# Patient Record
Sex: FEMALE | Race: OTHER | HISPANIC OR LATINO
[De-identification: names, ages, dates, MRNs, and addresses within clinical notes are randomized per-mention and may not be internally consistent; named-entity substitution may affect disease eponyms.]

---

## 2018-12-11 ENCOUNTER — TRANSCRIPTION ENCOUNTER (OUTPATIENT)
Age: 23
End: 2018-12-11

## 2018-12-11 ENCOUNTER — INPATIENT (INPATIENT)
Facility: HOSPITAL | Age: 23
LOS: 0 days | Discharge: LEFT BEFORE DISCHARGE | End: 2018-12-11
Attending: SURGERY | Admitting: SURGERY
Payer: MEDICAID

## 2018-12-11 VITALS
RESPIRATION RATE: 16 BRPM | HEART RATE: 55 BPM | HEIGHT: 64 IN | SYSTOLIC BLOOD PRESSURE: 110 MMHG | DIASTOLIC BLOOD PRESSURE: 62 MMHG | WEIGHT: 125 LBS | OXYGEN SATURATION: 95 % | TEMPERATURE: 97 F

## 2018-12-11 VITALS
HEART RATE: 69 BPM | DIASTOLIC BLOOD PRESSURE: 71 MMHG | SYSTOLIC BLOOD PRESSURE: 120 MMHG | TEMPERATURE: 98 F | OXYGEN SATURATION: 100 % | RESPIRATION RATE: 17 BRPM

## 2018-12-11 DIAGNOSIS — Z98.890 OTHER SPECIFIED POSTPROCEDURAL STATES: Chronic | ICD-10-CM

## 2018-12-11 DIAGNOSIS — K80.00 CALCULUS OF GALLBLADDER WITH ACUTE CHOLECYSTITIS WITHOUT OBSTRUCTION: ICD-10-CM

## 2018-12-11 LAB
ALBUMIN SERPL ELPH-MCNC: 3.8 G/DL — SIGNIFICANT CHANGE UP (ref 3.3–5)
ALP SERPL-CCNC: 121 U/L — HIGH (ref 40–120)
ALT FLD-CCNC: 30 U/L — SIGNIFICANT CHANGE UP (ref 12–78)
AMYLASE P1 CFR SERPL: 55 U/L — SIGNIFICANT CHANGE UP (ref 25–115)
ANION GAP SERPL CALC-SCNC: 10 MMOL/L — SIGNIFICANT CHANGE UP (ref 5–17)
APPEARANCE UR: CLEAR — SIGNIFICANT CHANGE UP
AST SERPL-CCNC: 15 U/L — SIGNIFICANT CHANGE UP (ref 15–37)
BASOPHILS # BLD AUTO: 0.03 K/UL — SIGNIFICANT CHANGE UP (ref 0–0.2)
BASOPHILS NFR BLD AUTO: 0.2 % — SIGNIFICANT CHANGE UP (ref 0–2)
BILIRUB SERPL-MCNC: 0.2 MG/DL — SIGNIFICANT CHANGE UP (ref 0.2–1.2)
BILIRUB UR-MCNC: NEGATIVE — SIGNIFICANT CHANGE UP
BUN SERPL-MCNC: 11 MG/DL — SIGNIFICANT CHANGE UP (ref 7–23)
CALCIUM SERPL-MCNC: 8.5 MG/DL — SIGNIFICANT CHANGE UP (ref 8.5–10.1)
CHLORIDE SERPL-SCNC: 105 MMOL/L — SIGNIFICANT CHANGE UP (ref 96–108)
CO2 SERPL-SCNC: 24 MMOL/L — SIGNIFICANT CHANGE UP (ref 22–31)
COLOR SPEC: YELLOW — SIGNIFICANT CHANGE UP
COMMENT - URINE: SIGNIFICANT CHANGE UP
CREAT SERPL-MCNC: 0.83 MG/DL — SIGNIFICANT CHANGE UP (ref 0.5–1.3)
DIFF PNL FLD: NEGATIVE — SIGNIFICANT CHANGE UP
EOSINOPHIL # BLD AUTO: 0.05 K/UL — SIGNIFICANT CHANGE UP (ref 0–0.5)
EOSINOPHIL NFR BLD AUTO: 0.4 % — SIGNIFICANT CHANGE UP (ref 0–6)
EPI CELLS # UR: SIGNIFICANT CHANGE UP
GLUCOSE SERPL-MCNC: 127 MG/DL — HIGH (ref 70–99)
GLUCOSE UR QL: NEGATIVE MG/DL — SIGNIFICANT CHANGE UP
HCG SERPL-ACNC: <1 MIU/ML — SIGNIFICANT CHANGE UP
HCT VFR BLD CALC: 40.1 % — SIGNIFICANT CHANGE UP (ref 34.5–45)
HGB BLD-MCNC: 13.6 G/DL — SIGNIFICANT CHANGE UP (ref 11.5–15.5)
IMM GRANULOCYTES NFR BLD AUTO: 0.2 % — SIGNIFICANT CHANGE UP (ref 0–1.5)
KETONES UR-MCNC: NEGATIVE — SIGNIFICANT CHANGE UP
LEUKOCYTE ESTERASE UR-ACNC: ABNORMAL
LIDOCAIN IGE QN: 153 U/L — SIGNIFICANT CHANGE UP (ref 73–393)
LYMPHOCYTES # BLD AUTO: 1.36 K/UL — SIGNIFICANT CHANGE UP (ref 1–3.3)
LYMPHOCYTES # BLD AUTO: 10.5 % — LOW (ref 13–44)
MCHC RBC-ENTMCNC: 29.8 PG — SIGNIFICANT CHANGE UP (ref 27–34)
MCHC RBC-ENTMCNC: 33.9 GM/DL — SIGNIFICANT CHANGE UP (ref 32–36)
MCV RBC AUTO: 87.7 FL — SIGNIFICANT CHANGE UP (ref 80–100)
MONOCYTES # BLD AUTO: 0.41 K/UL — SIGNIFICANT CHANGE UP (ref 0–0.9)
MONOCYTES NFR BLD AUTO: 3.2 % — SIGNIFICANT CHANGE UP (ref 2–14)
NEUTROPHILS # BLD AUTO: 11.02 K/UL — HIGH (ref 1.8–7.4)
NEUTROPHILS NFR BLD AUTO: 85.5 % — HIGH (ref 43–77)
NITRITE UR-MCNC: NEGATIVE — SIGNIFICANT CHANGE UP
NRBC # BLD: 0 /100 WBCS — SIGNIFICANT CHANGE UP (ref 0–0)
PH UR: 8 — SIGNIFICANT CHANGE UP (ref 5–8)
PLATELET # BLD AUTO: 239 K/UL — SIGNIFICANT CHANGE UP (ref 150–400)
POTASSIUM SERPL-MCNC: 3.7 MMOL/L — SIGNIFICANT CHANGE UP (ref 3.5–5.3)
POTASSIUM SERPL-SCNC: 3.7 MMOL/L — SIGNIFICANT CHANGE UP (ref 3.5–5.3)
PROT SERPL-MCNC: 7.2 GM/DL — SIGNIFICANT CHANGE UP (ref 6–8.3)
PROT UR-MCNC: NEGATIVE MG/DL — SIGNIFICANT CHANGE UP
RBC # BLD: 4.57 M/UL — SIGNIFICANT CHANGE UP (ref 3.8–5.2)
RBC # FLD: 12 % — SIGNIFICANT CHANGE UP (ref 10.3–14.5)
SODIUM SERPL-SCNC: 139 MMOL/L — SIGNIFICANT CHANGE UP (ref 135–145)
SP GR SPEC: 1.01 — SIGNIFICANT CHANGE UP (ref 1.01–1.02)
UROBILINOGEN FLD QL: NEGATIVE MG/DL — SIGNIFICANT CHANGE UP
WBC # BLD: 12.9 K/UL — HIGH (ref 3.8–10.5)
WBC # FLD AUTO: 12.9 K/UL — HIGH (ref 3.8–10.5)
WBC UR QL: SIGNIFICANT CHANGE UP

## 2018-12-11 PROCEDURE — 99285 EMERGENCY DEPT VISIT HI MDM: CPT | Mod: 25

## 2018-12-11 PROCEDURE — 74177 CT ABD & PELVIS W/CONTRAST: CPT | Mod: 26

## 2018-12-11 PROCEDURE — 76700 US EXAM ABDOM COMPLETE: CPT | Mod: 26

## 2018-12-11 PROCEDURE — 71045 X-RAY EXAM CHEST 1 VIEW: CPT | Mod: 26

## 2018-12-11 PROCEDURE — 99221 1ST HOSP IP/OBS SF/LOW 40: CPT

## 2018-12-11 RX ORDER — ACETAMINOPHEN 500 MG
650 TABLET ORAL EVERY 6 HOURS
Qty: 0 | Refills: 0 | Status: DISCONTINUED | OUTPATIENT
Start: 2018-12-11 | End: 2018-12-11

## 2018-12-11 RX ORDER — MOXIFLOXACIN HYDROCHLORIDE TABLETS, 400 MG 400 MG/1
1 TABLET, FILM COATED ORAL
Qty: 20 | Refills: 0
Start: 2018-12-11 | End: 2018-12-20

## 2018-12-11 RX ORDER — KETOROLAC TROMETHAMINE 30 MG/ML
30 SYRINGE (ML) INJECTION ONCE
Qty: 0 | Refills: 0 | Status: DISCONTINUED | OUTPATIENT
Start: 2018-12-11 | End: 2018-12-11

## 2018-12-11 RX ORDER — PIPERACILLIN AND TAZOBACTAM 4; .5 G/20ML; G/20ML
3.38 INJECTION, POWDER, LYOPHILIZED, FOR SOLUTION INTRAVENOUS EVERY 8 HOURS
Qty: 0 | Refills: 0 | Status: DISCONTINUED | OUTPATIENT
Start: 2018-12-11 | End: 2018-12-11

## 2018-12-11 RX ORDER — SODIUM CHLORIDE 9 MG/ML
1000 INJECTION INTRAMUSCULAR; INTRAVENOUS; SUBCUTANEOUS ONCE
Qty: 0 | Refills: 0 | Status: COMPLETED | OUTPATIENT
Start: 2018-12-11 | End: 2018-12-11

## 2018-12-11 RX ORDER — MORPHINE SULFATE 50 MG/1
2 CAPSULE, EXTENDED RELEASE ORAL EVERY 4 HOURS
Qty: 0 | Refills: 0 | Status: DISCONTINUED | OUTPATIENT
Start: 2018-12-11 | End: 2018-12-11

## 2018-12-11 RX ORDER — ONDANSETRON 8 MG/1
4 TABLET, FILM COATED ORAL ONCE
Qty: 0 | Refills: 0 | Status: COMPLETED | OUTPATIENT
Start: 2018-12-11 | End: 2018-12-11

## 2018-12-11 RX ORDER — HEPARIN SODIUM 5000 [USP'U]/ML
5000 INJECTION INTRAVENOUS; SUBCUTANEOUS EVERY 12 HOURS
Qty: 0 | Refills: 0 | Status: DISCONTINUED | OUTPATIENT
Start: 2018-12-11 | End: 2018-12-11

## 2018-12-11 RX ORDER — METRONIDAZOLE 500 MG
1 TABLET ORAL
Qty: 30 | Refills: 0
Start: 2018-12-11 | End: 2018-12-20

## 2018-12-11 RX ORDER — SODIUM CHLORIDE 9 MG/ML
1000 INJECTION INTRAMUSCULAR; INTRAVENOUS; SUBCUTANEOUS
Qty: 0 | Refills: 0 | Status: DISCONTINUED | OUTPATIENT
Start: 2018-12-11 | End: 2018-12-11

## 2018-12-11 RX ORDER — ONDANSETRON 8 MG/1
4 TABLET, FILM COATED ORAL EVERY 6 HOURS
Qty: 0 | Refills: 0 | Status: DISCONTINUED | OUTPATIENT
Start: 2018-12-11 | End: 2018-12-11

## 2018-12-11 RX ADMIN — Medication 30 MILLIGRAM(S): at 08:47

## 2018-12-11 RX ADMIN — SODIUM CHLORIDE 1000 MILLILITER(S): 9 INJECTION INTRAMUSCULAR; INTRAVENOUS; SUBCUTANEOUS at 08:01

## 2018-12-11 RX ADMIN — ONDANSETRON 4 MILLIGRAM(S): 8 TABLET, FILM COATED ORAL at 08:47

## 2018-12-11 NOTE — DISCHARGE NOTE ADULT - CARE PLAN
Principal Discharge DX:	Acute calculous cholecystitis  Goal:	symptoms free  Assessment and plan of treatment:	cont antibiotic as prescribed   low fat diet   if pain, nausea and vomiting persist - go to ED   advise lap sherman poss open but refused conservative management   follow up with Dr. Mendez

## 2018-12-11 NOTE — H&P ADULT - ATTENDING COMMENTS
I saw and examined the patient at bedside. I agree with the examination findings and reviewed the laboratory and imaging findings which are consistent with the diagnosis of acute cholecystitis. I spoke with the patient via [employer] interpretor (#[id number]) and conveyed these findings and the diagnosis and that the recommended course of action would be to perform a laparoscopic, possible open, cholecystectomy. However the patient refused to undergo surgery at this present time due to not having anyone else available to care for her 2 year old daughter at home. I spoke at length with the patient regarding the risks of not receiving surgery at this time, which includes worsening of her symptoms, possibility of developing gangrenous gallbladder and associated complications, increased difficulty of surgery should she later change her mind or agree to surgery, The patient stated she understood the risks of not receiving surgical intervention and that she would be going against medical advice. Therefore the patient will leave with PO antibiotics and follow as an outpatient. The patient is aware that should the symptoms continue or worsen, she needs to return to the ED, and agrees to this plan. All questions were answered.

## 2018-12-11 NOTE — ED ADULT NURSE NOTE - CHPI ED NUR SYMPTOMS NEG
no burning urination/no diarrhea/no abdominal distension/no dysuria/no fever/no hematuria/no blood in stool/no chills

## 2018-12-11 NOTE — DISCHARGE NOTE ADULT - CARE PROVIDER_API CALL
Jerome Mendez (MD), Surgery  [address] [facility] [place]  [place] [address]  Phone: [phone]  Fax: [phone]

## 2018-12-11 NOTE — ED ADULT NURSE NOTE - OBJECTIVE STATEMENT
patient came in ED from home with c/o abdominal pain radiating to the left flank X 3 hours PTA. alert and oriented x 4. afebrile. nonlabored respiration noted. abdomen non-distended, tender to palpation. + nausea. denies urinary symptoms.

## 2018-12-11 NOTE — H&P ADULT - PROBLEM SELECTOR PLAN 1
After Visit Summary   11/20/2018    Sherry Rene    MRN: [id number]           Patient Information     Date Of Birth          2008        Visit Information        Provider Department      11/20/2018 10:20 AM Grey Figueroa DO [facility]        Today's Diagnoses     Nut allergy    -  1      Care Instructions    Allergy Staff Appt Hours Shot Hours Locations    Physician     Grey Figueroa DO       Support Staff     Daisy MAO, RN      Daina MAO, [facility]  Monday:                      [place] 8-7     Tuesday:         [place] 8-5     Wednesday:        [place]: 7-5     Friday:        [place] 7-5   [place] Monday: 9-5:50        Wednesday: 2-5:50        Friday: 7-12:50     [place]        Tuesday: 7-10:50        Thursday: 1:30-6:30     [place] Monday: 7:10-4:50        Tuesday: 12:30-6:30        Thursday: 7-11:50 [facility]  [address] [place] [address]  Appt Line: [phone]  Allergy RN (Monday):  [phone]    [facility]  [address] [place] [address]  Appt Line: [phone]  Allergy RN (Tues & Wed):  [phone]    [facility]  [address] [place] [address]  Appt Line: [phone]  Allergy RN (Friday):  [phone]       Important Scheduling Information  Aspirin Desensitization: Appt will last 2 clinic days. Please call the Allergy RN line for your clinic to schedule. Discontinue antihistamines 7 days prior to the appointment.     Food Challenges: Appt will last 3-4 hours. Please call the Allergy RN line for your clinic to schedule. Discontinue antihistamines 7 days prior to the appointment.     Penicillin Testing: Appt will last 2-3 hours. Please call the Allergy RN line for your clinic to schedule. Discontinue antihistamines 7 days prior to the appointment.     Skin Testing: Appt will about 40 minutes. Call the appointment line for your clinic to schedule. Discontinue antihistamines 7 days prior to the  appointment.     Venom Testing: Appt will last 2-3 hours. Please call the Allergy RN line for your clinic to schedule. Discontinue antihistamines 7 days prior to the appointment.     Thank you for trusting us with your Allergy, Asthma, and Immunology care. Please feel free to contact us with any questions or concerns you may have.      - Skin testing positive for tree nuts. Continue to avoid tree nuts. You can still consume peanut. Read labels.   - See anaphylaxis action plan.   - Return yearly.    Tree Nut Allergy     An estimated 1.8 million Americans have an allergy to tree nuts. Allergic reactions to tree nuts are among the leading causes of fatal and near-fatal reactions to foods. Tree nuts include, but are not limited to, walnut, almond, hazelnut, coconut, cashew, pistachio, and Brazil nuts. These are not to be confused or grouped together with peanut, which is a legume, or seeds, such as sunflower or sesame.  Like those with peanut allergies, most individuals who are diagnosed with an allergy to tree nuts tend to have a lifelong allergy. As you ll see below, tree nuts can be found as ingredients in many unexpected places.    Some Unexpected Sources of Tree Nuts    Salads and salad dressing    Barbecue sauce    Breading for chicken    Pancakes    Meat-free burgers     Pasta    Honey    Fish dishes    Pie crust    Mandelonas (peanuts soaked in almond flavoring)    Mortadella (may contain pistachios)    Keep in Mind    Many experts advise patients allergic to tree nuts to avoid peanuts and other tree nuts because of the high likelihood of cross-contact at processing facilities, which process peanuts and different tree nuts on the same equipment. Further, a person with an allergy to one type of tree nut has a higher chance of being allergic to other types. Discuss with your doctor whether to avoid other tree nuts.    Tree nuts may be found in a wide range of unexpected foods for flavor or consistency. If  "ingredient information is not provided for a particular food or you question its accuracy, avoid the food completely.    Younger siblings of children allergic to tree nuts may be at increased risk for allergy to tree nuts. Your doctor can provide guidance about testing for siblings.    Tree nuts can cause severe allergic reactions. If your doctor has prescribed epinephrine, be sure to always carry it with you. Learn more about anaphylaxis.     Most experts advise patients who have been diagnosed with an allergy to specific tree nuts to avoid all tree nuts.       Commonly Asked Questions    Should coconut be avoided by someone with a tree nut allergy?    Discuss this with your doctor. Coconut, the seed of a drupaceous fruit, has typically not been restricted in the diets of people with tree nut allergy. However, in October of 2006, the FDA began identifying coconut as a tree nut. The available medical literature contains documentation of a small number of allergic reactions to coconut; most occurred in people who were not allergic to other tree nuts. Ask your doctor if you need to avoid coconut.    Is nutmeg safe?    Nutmeg is obtained from the seeds of the tropical tree species Myristica fragrans. It is generally safe for an individual with a tree nut allergy.    Should water chestnuts be avoided?    The water chestnut is not a nut; it is an edible portion of a plant root known as a \"corm.\" It is safe for someone who is allergic to tree nuts.    For more information: www.foodallergy.org            Follow-ups after your visit        Follow-up notes from your care team     Return in about 1 year (around 11/20/2019).      Who to contact     If you have questions or need follow up information about today's clinic visit or your schedule please contact [facility] directly at [phone].  Normal or non-critical lab and imaging results will be communicated to you by MyChart, letter or phone within 4 " business days after the clinic has received the results. If you do not hear from us within 7 days, please contact the clinic through [employer] or phone. If you have a critical or abnormal lab result, we will notify you by phone as soon as possible.  Submit refill requests through [employer] or call your pharmacy and they will forward the refill request to us. Please allow 3 business days for your refill to be completed.          Additional Information About Your Visit        SpotOnWay Information     Spot[employer] lets you send messages to your doctor, view your test results, renew your prescriptions, schedule appointments and more. To sign up, go to [email or web address], contact your [place] clinic or call [phone] during business hours.            Care EveryWhere ID     This is your Care EveryWhere ID. This could be used by other organizations to access your [place] medical records  [id number]        Your Vitals Were     Pulse Temperature Respirations Pulse Oximetry          75 97.5  F (36.4  C) (Oral) 19 99%         Blood Pressure from Last 3 Encounters:   11/20/18 100/62   08/27/18 94/64   08/31/16 96/56    Weight from Last 3 Encounters:   11/20/18 34.9 kg (77 lb) (12 %)*   08/27/18 33.6 kg (74 lb) (10 %)*   08/31/16 26.5 kg (58 lb 8 oz) (9 %)*     * Growth percentiles are based on [facility] 2-20 Years data.              We Performed the Following     Allergen almonds IgE     Allergen cashew IgE     Allergen pecan nut IgE     Allergen pistachio nut IgE     Allergen walnuts IgE     ALLERGY SKIN TESTS,ALLERGENS          Today's Medication Changes          These changes are accurate as of 11/20/18 11:24 AM.  If you have any questions, ask your nurse or doctor.               These medicines have changed or have updated prescriptions.        Dose/Directions    * EPINEPHrine 0.3 MG/0.3ML injection 2-pack   Commonly known as:  EPIPEN/ADRENACLICK/or ANY BX GENERIC EQUIV   This may have changed:  Another medication with the  same name was added. Make sure you understand how and when to take each.   Used for:  Nut allergy   Changed by:  Grey Figueroa DO        Dose:  0.3 mg   Inject 0.3 mLs (0.3 mg) into the muscle as needed Inject 0.3 mLs (0.3 mg) into the muscle as needed for anaphylaxis   Quantity:  0.6 mL   Refills:  1       * EPINEPHrine 0.3 MG/0.3ML injection 2-pack   Commonly known as:  AUVI-Q   This may have changed:  You were already taking a medication with the same name, and this prescription was added. Make sure you understand how and when to take each.   Used for:  Nut allergy   Changed by:  Grey Figueroa,         Dose:  0.3 mg   Inject 0.3 mLs (0.3 mg) into the muscle as needed for anaphylaxis   Quantity:  2 mL   Refills:  1       * Notice:  This list has 2 medication(s) that are the same as other medications prescribed for you. Read the directions carefully, and ask your doctor or other care provider to review them with you.         Where to get your medicines      These medications were sent to [facility] [place] [address]     Phone:  [phone]     EPINEPHrine 0.3 MG/0.3ML injection 2-pack                Primary Care Provider Office Phone # Fax #    Analia Henning [phone]       [address] [place] [address]        Equal Access to Services     : Hadii trena hernandezo Sodanelle, waaxda luqadaha, qaybta kaalmada pamelayada, jose fuentes. So [facility] [place] [phone].    ATENCIÓN: Si habla español, tiene a [place] disposición servicios gratuitos de asistencia lingüística. Llame al [phone].    We comply with applicable federal civil rights laws and [place] laws. We do not discriminate on the basis of race, color, national origin, age, disability, sex, sexual orientation, or gender identity.            Thank you!     Thank you for choosing [facility]   for your care. Our goal is always to provide you with excellent care. Hearing back from our patients is one way we can continue to improve our services. Please take a few minutes to complete the written survey that you may receive in the mail after your visit with us. Thank you!             Your Updated Medication List - Protect others around you: Learn how to safely use, store and throw away your medicines at www.disposemymeds.org.          This list is accurate as of 11/20/18 11:24 AM.  Always use your most recent med list.                   Brand Name Dispense Instructions for use Diagnosis    * EPINEPHrine 0.3 MG/0.3ML injection 2-pack    EPIPEN/ADRENACLICK/or ANY BX GENERIC EQUIV    0.6 mL    Inject 0.3 mLs (0.3 mg) into the muscle as needed Inject 0.3 mLs (0.3 mg) into the muscle as needed for anaphylaxis    Nut allergy       * EPINEPHrine 0.3 MG/0.3ML injection 2-pack    AUVI-Q    2 mL    Inject 0.3 mLs (0.3 mg) into the muscle as needed for anaphylaxis    Nut allergy       triamcinolone 0.1 % ointment    KENALOG    30 g    Apply sparingly to affected area three times daily for 14 days.    Gingivitis       * Notice:  This list has 2 medication(s) that are the same as other medications prescribed for you. Read the directions carefully, and ask your doctor or other care provider to review them with you.       npo   ivf hydration   abx   preop labs   discussed with Dr. Smith -- for lap sherman poss open

## 2018-12-11 NOTE — DISCHARGE NOTE ADULT - HOSPITAL COURSE
Patient admitted for abdominal pain.  Dxtic work up with acute calculous cholecystitis.  Patient advised surgery but she refuses.  Risk benefit discussed by Dr. Mendez in length but patient refused.  Patient being managed conservatively with Antibiotic but would like to be discharged against medical advise.    [place]  [id number]

## 2018-12-11 NOTE — H&P ADULT - NSHPLABSRESULTS_GEN_ALL_CORE
(12-11 @ 07:46):               13.6   12.90)-----------(239                40.1   Neurophils% (auto):   85.5    manual%: x      Lymphocytes% (auto):  10.5    manual%: x      Eosinphils% (auto):   0.4     manual%: x      Bands%: x       blasts%: x        12-11    139  |  105  |  11  ----------------------------<  127<H>  3.7   |  24  |  0.83    Ca    8.5      11 Dec 2018 07:46    TPro  7.2  /  Alb  3.8  /  TBili  0.2  /  DBili  x   /  AST  15  /  ALT  30  /  AlkPhos  121<H>  12-11    < from: US Abdomen Complete (12.11.18 @ 13:51) >    Findings compatible with acute cholecystitis, as described above.    < end of copied text >    < from: CT Abdomen and Pelvis w/ IV Cont (12.11.18 @ 10:10) >      Impression: Normal appendix. No bowel obstruction or grossly thickened   bowel wall.    Cholelithiasis with small pericholecystic fluid. Findings may represent   acute cholecystitis. If clinically indicated, gallbladder ultrasound   and/or HIDA scan may be pursued for further evaluation.    2.2 cm hypodense structure in the right adnexal region, suggestive of a   right ovarian cyst. If clinically indicated, pelvic ultrasound may be   pursued for further motion.    4 mm right lower lobe lung nodule adjacent to the right major fissure.   This may represent a ananya-fissural lymph node. If the patient is in the   high risk category (i.e. smoker), follow-up chest CT may be pursued in 12   months to ensure stability.    < end of copied text >

## 2018-12-11 NOTE — DISCHARGE NOTE ADULT - PLAN OF CARE
symptoms free cont antibiotic as prescribed   low fat diet   if pain, nausea and vomiting persist - go to ED   advise urszula whitaker poss open but refused conservative management   follow up with Dr. Mendez

## 2018-12-11 NOTE — H&P ADULT - HISTORY OF PRESENT ILLNESS
A 23 F with no significant PMH c/o abdominal pain x few hours.  Patient woke up with  moderate epigastric pain radiating to RUQ and periumbilical area this am.   There were no known triggering or aggravating factors.  Condition is associated with nausea/vomiting and anorexia.  Condition persisted and worsened prompting this consult and admission.     Patient denies fever/chills.

## 2018-12-11 NOTE — H&P ADULT - NSHPREVIEWOFSYSTEMS_GEN_ALL_CORE
REVIEW OF SYSTEMS  Respiratory and Thorax:  no sob 	  Cardiovascular:	no cp   Gastrointestinal:  Abd pain, nausea/vomiting

## 2018-12-11 NOTE — DISCHARGE NOTE ADULT - MEDICATION SUMMARY - MEDICATIONS TO TAKE
I will START or STAY ON the medications listed below when I get home from the hospital:    Flagyl 500 mg oral tablet  -- 1 tab(s) by mouth 3 times a day   -- Do not drink alcoholic beverages when taking this medication.  Finish all this medication unless otherwise directed by prescriber.  May discolor urine or feces.    -- Indication: For ACUTE CHOLECYSTITIS    Cipro 500 mg oral tablet  -- 1 tab(s) by mouth every 12 hours   -- Avoid prolonged or excessive exposure to direct and/or artificial sunlight while taking this medication.  Check with your doctor before becoming pregnant.  Do not take dairy products, antacids, or iron preparations within one hour of this medication.  Finish all this medication unless otherwise directed by prescriber.  Medication should be taken with plenty of water.    -- Indication: For Acute calculous cholecystitis

## 2018-12-11 NOTE — ED PROVIDER NOTE - OBJECTIVE STATEMENT
23 year old female presents today c/o abdominal pain for the last three hours, she described 23 year old female presents today c/o abdominal pain for the last three hours, she described a severe pain in the left flank radiating into her lower abdomen associated with nausea and vomiting x 1, (-) h/o kidney stones (-) hematuria (-) urinary symptoms (-) fevers or chills

## 2018-12-11 NOTE — DISCHARGE NOTE ADULT - PATIENT PORTAL LINK FT
You can access the [facility] Patient Portal, offered by [facility], by registering with the following website: [email or web address]

## 2018-12-11 NOTE — H&P ADULT - NSHPPHYSICALEXAM_GEN_ALL_CORE
ICU Vital Signs Last 24 Hrs  T(C): 35.7 (11 Dec 2018 14:05), Max: 37.1 (11 Dec 2018 11:18)  T(F): 96.2 (11 Dec 2018 14:05), Max: 98.7 (11 Dec 2018 11:18)  HR: 55 (11 Dec 2018 14:05) (55 - 56)  BP: 106/67 (11 Dec 2018 14:05) (106/67 - 114/69)  BP(mean): --  ABP: --  ABP(mean): --  RR: 14 (11 Dec 2018 14:05) (14 - 18)  SpO2: 100% (11 Dec 2018 14:05) (95% - 100%)  HEENT:  pink conjunctivae, anicteric sclerae, PERRLA, EOMI   c/l;  cta   cvs:  s1s2   abd:  nd, soft, +BS +tenderness RUQ, epigastric area,  no guarding   ext calf soft, non tender

## 2018-12-11 NOTE — ED ADULT NURSE REASSESSMENT NOTE - NS ED NURSE REASSESS COMMENT FT1
GARCIA Curtis and RN Emma in OR informed that patient is refusing surgery today due to family obligations. GARCIA Curtis and DR. Mendez spoke with patient , as per patient she will receive antibiotics and food and be re evaluated if she can be d/c home today.

## 2018-12-12 LAB
CULTURE RESULTS: SIGNIFICANT CHANGE UP
SPECIMEN SOURCE: SIGNIFICANT CHANGE UP

## 2018-12-13 DIAGNOSIS — K80.00 CALCULUS OF GALLBLADDER WITH ACUTE CHOLECYSTITIS WITHOUT OBSTRUCTION: ICD-10-CM

## 2018-12-13 DIAGNOSIS — Z53.29 PROCEDURE AND TREATMENT NOT CARRIED OUT BECAUSE OF PATIENT'S DECISION FOR OTHER REASONS: ICD-10-CM

## 2018-12-13 DIAGNOSIS — R10.9 UNSPECIFIED ABDOMINAL PAIN: ICD-10-CM

## 2019-07-25 ENCOUNTER — EMERGENCY (EMERGENCY)
Facility: HOSPITAL | Age: 24
LOS: 0 days | Discharge: ANOTHER HOSPITAL | End: 2019-07-26
Attending: EMERGENCY MEDICINE
Payer: MEDICAID

## 2019-07-25 ENCOUNTER — EMERGENCY (EMERGENCY)
Facility: HOSPITAL | Age: 24
LOS: 0 days | Discharge: HOME | End: 2019-07-25
Attending: EMERGENCY MEDICINE
Payer: MEDICAID

## 2019-07-25 VITALS
SYSTOLIC BLOOD PRESSURE: 113 MMHG | OXYGEN SATURATION: 100 % | TEMPERATURE: 98 F | DIASTOLIC BLOOD PRESSURE: 67 MMHG | HEIGHT: 63 IN | RESPIRATION RATE: 17 BRPM | WEIGHT: 126.1 LBS | HEART RATE: 58 BPM

## 2019-07-25 VITALS
DIASTOLIC BLOOD PRESSURE: 66 MMHG | TEMPERATURE: 99 F | HEART RATE: 59 BPM | OXYGEN SATURATION: 98 % | RESPIRATION RATE: 18 BRPM | SYSTOLIC BLOOD PRESSURE: 107 MMHG

## 2019-07-25 VITALS
RESPIRATION RATE: 18 BRPM | HEIGHT: 63 IN | OXYGEN SATURATION: 99 % | TEMPERATURE: 98 F | SYSTOLIC BLOOD PRESSURE: 108 MMHG | WEIGHT: 126.1 LBS | DIASTOLIC BLOOD PRESSURE: 57 MMHG | HEART RATE: 71 BPM

## 2019-07-25 VITALS
SYSTOLIC BLOOD PRESSURE: 100 MMHG | RESPIRATION RATE: 19 BRPM | OXYGEN SATURATION: 100 % | TEMPERATURE: 98 F | HEART RATE: 60 BPM | DIASTOLIC BLOOD PRESSURE: 62 MMHG

## 2019-07-25 DIAGNOSIS — R10.13 EPIGASTRIC PAIN: ICD-10-CM

## 2019-07-25 DIAGNOSIS — Z3A.13 13 WEEKS GESTATION OF PREGNANCY: ICD-10-CM

## 2019-07-25 DIAGNOSIS — O99.89 OTHER SPECIFIED DISEASES AND CONDITIONS COMPLICATING PREGNANCY, CHILDBIRTH AND THE PUERPERIUM: ICD-10-CM

## 2019-07-25 DIAGNOSIS — Z98.890 OTHER SPECIFIED POSTPROCEDURAL STATES: Chronic | ICD-10-CM

## 2019-07-25 DIAGNOSIS — M54.9 DORSALGIA, UNSPECIFIED: ICD-10-CM

## 2019-07-25 DIAGNOSIS — O23.02 INFECTIONS OF KIDNEY IN PREGNANCY, SECOND TRIMESTER: ICD-10-CM

## 2019-07-25 DIAGNOSIS — O23.41 UNSPECIFIED INFECTION OF URINARY TRACT IN PREGNANCY, FIRST TRIMESTER: ICD-10-CM

## 2019-07-25 LAB
ALBUMIN SERPL ELPH-MCNC: 3.3 G/DL — SIGNIFICANT CHANGE UP (ref 3.3–5)
ALBUMIN SERPL ELPH-MCNC: 3.5 G/DL — SIGNIFICANT CHANGE UP (ref 3.3–5)
ALP SERPL-CCNC: 71 U/L — SIGNIFICANT CHANGE UP (ref 40–120)
ALP SERPL-CCNC: 85 U/L — SIGNIFICANT CHANGE UP (ref 40–120)
ALT FLD-CCNC: 30 U/L — SIGNIFICANT CHANGE UP (ref 12–78)
ALT FLD-CCNC: 32 U/L — SIGNIFICANT CHANGE UP (ref 12–78)
AMYLASE P1 CFR SERPL: 50 U/L — SIGNIFICANT CHANGE UP (ref 25–115)
ANION GAP SERPL CALC-SCNC: 7 MMOL/L — SIGNIFICANT CHANGE UP (ref 5–17)
ANION GAP SERPL CALC-SCNC: 8 MMOL/L — SIGNIFICANT CHANGE UP (ref 5–17)
APPEARANCE UR: ABNORMAL
AST SERPL-CCNC: 17 U/L — SIGNIFICANT CHANGE UP (ref 15–37)
AST SERPL-CCNC: 17 U/L — SIGNIFICANT CHANGE UP (ref 15–37)
BACTERIA # UR AUTO: ABNORMAL
BASOPHILS # BLD AUTO: 0.02 K/UL — SIGNIFICANT CHANGE UP (ref 0–0.2)
BASOPHILS # BLD AUTO: 0.02 K/UL — SIGNIFICANT CHANGE UP (ref 0–0.2)
BASOPHILS NFR BLD AUTO: 0.2 % — SIGNIFICANT CHANGE UP (ref 0–2)
BASOPHILS NFR BLD AUTO: 0.2 % — SIGNIFICANT CHANGE UP (ref 0–2)
BILIRUB SERPL-MCNC: 0.3 MG/DL — SIGNIFICANT CHANGE UP (ref 0.2–1.2)
BILIRUB SERPL-MCNC: 0.4 MG/DL — SIGNIFICANT CHANGE UP (ref 0.2–1.2)
BILIRUB UR-MCNC: NEGATIVE — SIGNIFICANT CHANGE UP
BUN SERPL-MCNC: 6 MG/DL — LOW (ref 7–23)
BUN SERPL-MCNC: 8 MG/DL — SIGNIFICANT CHANGE UP (ref 7–23)
CALCIUM SERPL-MCNC: 8.4 MG/DL — LOW (ref 8.5–10.1)
CALCIUM SERPL-MCNC: 8.5 MG/DL — SIGNIFICANT CHANGE UP (ref 8.5–10.1)
CHLORIDE SERPL-SCNC: 104 MMOL/L — SIGNIFICANT CHANGE UP (ref 96–108)
CHLORIDE SERPL-SCNC: 105 MMOL/L — SIGNIFICANT CHANGE UP (ref 96–108)
CO2 SERPL-SCNC: 23 MMOL/L — SIGNIFICANT CHANGE UP (ref 22–31)
CO2 SERPL-SCNC: 25 MMOL/L — SIGNIFICANT CHANGE UP (ref 22–31)
COLOR SPEC: YELLOW — SIGNIFICANT CHANGE UP
CREAT SERPL-MCNC: 0.51 MG/DL — SIGNIFICANT CHANGE UP (ref 0.5–1.3)
CREAT SERPL-MCNC: 0.53 MG/DL — SIGNIFICANT CHANGE UP (ref 0.5–1.3)
DIFF PNL FLD: ABNORMAL
EOSINOPHIL # BLD AUTO: 0.05 K/UL — SIGNIFICANT CHANGE UP (ref 0–0.5)
EOSINOPHIL # BLD AUTO: 0.07 K/UL — SIGNIFICANT CHANGE UP (ref 0–0.5)
EOSINOPHIL NFR BLD AUTO: 0.6 % — SIGNIFICANT CHANGE UP (ref 0–6)
EOSINOPHIL NFR BLD AUTO: 0.7 % — SIGNIFICANT CHANGE UP (ref 0–6)
EPI CELLS # UR: ABNORMAL
GLUCOSE SERPL-MCNC: 83 MG/DL — SIGNIFICANT CHANGE UP (ref 70–99)
GLUCOSE SERPL-MCNC: 91 MG/DL — SIGNIFICANT CHANGE UP (ref 70–99)
GLUCOSE UR QL: NEGATIVE MG/DL — SIGNIFICANT CHANGE UP
HCG SERPL-ACNC: HIGH MIU/ML
HCT VFR BLD CALC: 35.8 % — SIGNIFICANT CHANGE UP (ref 34.5–45)
HCT VFR BLD CALC: 36.6 % — SIGNIFICANT CHANGE UP (ref 34.5–45)
HGB BLD-MCNC: 12.3 G/DL — SIGNIFICANT CHANGE UP (ref 11.5–15.5)
HGB BLD-MCNC: 12.3 G/DL — SIGNIFICANT CHANGE UP (ref 11.5–15.5)
IMM GRANULOCYTES NFR BLD AUTO: 0.2 % — SIGNIFICANT CHANGE UP (ref 0–1.5)
IMM GRANULOCYTES NFR BLD AUTO: 0.3 % — SIGNIFICANT CHANGE UP (ref 0–1.5)
KETONES UR-MCNC: ABNORMAL
LEUKOCYTE ESTERASE UR-ACNC: ABNORMAL
LIDOCAIN IGE QN: 104 U/L — SIGNIFICANT CHANGE UP (ref 73–393)
LYMPHOCYTES # BLD AUTO: 1.24 K/UL — SIGNIFICANT CHANGE UP (ref 1–3.3)
LYMPHOCYTES # BLD AUTO: 1.69 K/UL — SIGNIFICANT CHANGE UP (ref 1–3.3)
LYMPHOCYTES # BLD AUTO: 15.1 % — SIGNIFICANT CHANGE UP (ref 13–44)
LYMPHOCYTES # BLD AUTO: 17.3 % — SIGNIFICANT CHANGE UP (ref 13–44)
MCHC RBC-ENTMCNC: 29.2 PG — SIGNIFICANT CHANGE UP (ref 27–34)
MCHC RBC-ENTMCNC: 30.1 PG — SIGNIFICANT CHANGE UP (ref 27–34)
MCHC RBC-ENTMCNC: 33.6 GM/DL — SIGNIFICANT CHANGE UP (ref 32–36)
MCHC RBC-ENTMCNC: 34.4 GM/DL — SIGNIFICANT CHANGE UP (ref 32–36)
MCV RBC AUTO: 86.9 FL — SIGNIFICANT CHANGE UP (ref 80–100)
MCV RBC AUTO: 87.7 FL — SIGNIFICANT CHANGE UP (ref 80–100)
MONOCYTES # BLD AUTO: 0.48 K/UL — SIGNIFICANT CHANGE UP (ref 0–0.9)
MONOCYTES # BLD AUTO: 0.57 K/UL — SIGNIFICANT CHANGE UP (ref 0–0.9)
MONOCYTES NFR BLD AUTO: 5.8 % — SIGNIFICANT CHANGE UP (ref 2–14)
MONOCYTES NFR BLD AUTO: 5.9 % — SIGNIFICANT CHANGE UP (ref 2–14)
NEUTROPHILS # BLD AUTO: 6.39 K/UL — SIGNIFICANT CHANGE UP (ref 1.8–7.4)
NEUTROPHILS # BLD AUTO: 7.38 K/UL — SIGNIFICANT CHANGE UP (ref 1.8–7.4)
NEUTROPHILS NFR BLD AUTO: 75.7 % — SIGNIFICANT CHANGE UP (ref 43–77)
NEUTROPHILS NFR BLD AUTO: 78 % — HIGH (ref 43–77)
NITRITE UR-MCNC: NEGATIVE — SIGNIFICANT CHANGE UP
NRBC # BLD: 0 /100 WBCS — SIGNIFICANT CHANGE UP (ref 0–0)
NRBC # BLD: 0 /100 WBCS — SIGNIFICANT CHANGE UP (ref 0–0)
PH UR: 8 — SIGNIFICANT CHANGE UP (ref 5–8)
PLATELET # BLD AUTO: 183 K/UL — SIGNIFICANT CHANGE UP (ref 150–400)
PLATELET # BLD AUTO: 188 K/UL — SIGNIFICANT CHANGE UP (ref 150–400)
POTASSIUM SERPL-MCNC: 3.5 MMOL/L — SIGNIFICANT CHANGE UP (ref 3.5–5.3)
POTASSIUM SERPL-MCNC: 3.7 MMOL/L — SIGNIFICANT CHANGE UP (ref 3.5–5.3)
POTASSIUM SERPL-SCNC: 3.5 MMOL/L — SIGNIFICANT CHANGE UP (ref 3.5–5.3)
POTASSIUM SERPL-SCNC: 3.7 MMOL/L — SIGNIFICANT CHANGE UP (ref 3.5–5.3)
PROT SERPL-MCNC: 7 GM/DL — SIGNIFICANT CHANGE UP (ref 6–8.3)
PROT SERPL-MCNC: 7.3 GM/DL — SIGNIFICANT CHANGE UP (ref 6–8.3)
PROT UR-MCNC: NEGATIVE MG/DL — SIGNIFICANT CHANGE UP
RBC # BLD: 4.08 M/UL — SIGNIFICANT CHANGE UP (ref 3.8–5.2)
RBC # BLD: 4.21 M/UL — SIGNIFICANT CHANGE UP (ref 3.8–5.2)
RBC # FLD: 12.5 % — SIGNIFICANT CHANGE UP (ref 10.3–14.5)
RBC # FLD: 12.6 % — SIGNIFICANT CHANGE UP (ref 10.3–14.5)
RBC CASTS # UR COMP ASSIST: SIGNIFICANT CHANGE UP /HPF (ref 0–4)
SODIUM SERPL-SCNC: 135 MMOL/L — SIGNIFICANT CHANGE UP (ref 135–145)
SODIUM SERPL-SCNC: 137 MMOL/L — SIGNIFICANT CHANGE UP (ref 135–145)
SP GR SPEC: 1.01 — SIGNIFICANT CHANGE UP (ref 1.01–1.02)
UROBILINOGEN FLD QL: NEGATIVE MG/DL — SIGNIFICANT CHANGE UP
WBC # BLD: 8.2 K/UL — SIGNIFICANT CHANGE UP (ref 3.8–10.5)
WBC # BLD: 9.76 K/UL — SIGNIFICANT CHANGE UP (ref 3.8–10.5)
WBC # FLD AUTO: 8.2 K/UL — SIGNIFICANT CHANGE UP (ref 3.8–10.5)
WBC # FLD AUTO: 9.76 K/UL — SIGNIFICANT CHANGE UP (ref 3.8–10.5)
WBC UR QL: ABNORMAL

## 2019-07-25 PROCEDURE — 99285 EMERGENCY DEPT VISIT HI MDM: CPT

## 2019-07-25 PROCEDURE — 76856 US EXAM PELVIC COMPLETE: CPT | Mod: 26

## 2019-07-25 PROCEDURE — 76700 US EXAM ABDOM COMPLETE: CPT | Mod: 26

## 2019-07-25 PROCEDURE — 99284 EMERGENCY DEPT VISIT MOD MDM: CPT

## 2019-07-25 RX ORDER — CEFTRIAXONE 500 MG/1
1000 INJECTION, POWDER, FOR SOLUTION INTRAMUSCULAR; INTRAVENOUS ONCE
Refills: 0 | Status: COMPLETED | OUTPATIENT
Start: 2019-07-25 | End: 2019-07-25

## 2019-07-25 RX ORDER — ACETAMINOPHEN 500 MG
650 TABLET ORAL ONCE
Refills: 0 | Status: COMPLETED | OUTPATIENT
Start: 2019-07-25 | End: 2019-07-25

## 2019-07-25 RX ORDER — CEFPODOXIME PROXETIL 100 MG
1 TABLET ORAL
Qty: 20 | Refills: 0
Start: 2019-07-25 | End: 2019-08-03

## 2019-07-25 RX ORDER — ONDANSETRON 8 MG/1
4 TABLET, FILM COATED ORAL ONCE
Refills: 0 | Status: COMPLETED | OUTPATIENT
Start: 2019-07-25 | End: 2019-07-25

## 2019-07-25 RX ORDER — SODIUM CHLORIDE 9 MG/ML
1000 INJECTION INTRAMUSCULAR; INTRAVENOUS; SUBCUTANEOUS ONCE
Refills: 0 | Status: COMPLETED | OUTPATIENT
Start: 2019-07-25 | End: 2019-07-25

## 2019-07-25 RX ORDER — CEFPODOXIME PROXETIL 100 MG
200 TABLET ORAL ONCE
Refills: 0 | Status: COMPLETED | OUTPATIENT
Start: 2019-07-25 | End: 2019-07-25

## 2019-07-25 RX ORDER — CYCLOBENZAPRINE HYDROCHLORIDE 10 MG/1
10 TABLET, FILM COATED ORAL ONCE
Refills: 0 | Status: DISCONTINUED | OUTPATIENT
Start: 2019-07-25 | End: 2019-07-25

## 2019-07-25 RX ORDER — CEPHALEXIN 500 MG
1 CAPSULE ORAL
Qty: 28 | Refills: 0
Start: 2019-07-25 | End: 2019-08-07

## 2019-07-25 RX ADMIN — Medication 200 MILLIGRAM(S): at 08:53

## 2019-07-25 RX ADMIN — SODIUM CHLORIDE 1000 MILLILITER(S): 9 INJECTION INTRAMUSCULAR; INTRAVENOUS; SUBCUTANEOUS at 23:03

## 2019-07-25 RX ADMIN — Medication 30 MILLILITER(S): at 04:16

## 2019-07-25 RX ADMIN — ONDANSETRON 4 MILLIGRAM(S): 8 TABLET, FILM COATED ORAL at 23:21

## 2019-07-25 RX ADMIN — SODIUM CHLORIDE 1000 MILLILITER(S): 9 INJECTION INTRAMUSCULAR; INTRAVENOUS; SUBCUTANEOUS at 04:16

## 2019-07-25 RX ADMIN — Medication 650 MILLIGRAM(S): at 07:09

## 2019-07-25 RX ADMIN — SODIUM CHLORIDE 1000 MILLILITER(S): 9 INJECTION INTRAMUSCULAR; INTRAVENOUS; SUBCUTANEOUS at 05:15

## 2019-07-25 RX ADMIN — CEFTRIAXONE 100 MILLIGRAM(S): 500 INJECTION, POWDER, FOR SOLUTION INTRAMUSCULAR; INTRAVENOUS at 23:03

## 2019-07-25 NOTE — ED ADULT TRIAGE NOTE - CHIEF COMPLAINT QUOTE
Pt complains of epigastric pain x today and back pain x Monday 7/10 burning. Denies nausea and vomiting. Pt states she has a history of gallstones. Pt is 13 weeks pregnant

## 2019-07-25 NOTE — ED PROVIDER NOTE - CARE PLAN
Principal Discharge DX:	Epigastric pain  Secondary Diagnosis:	Back pain in pregnancy Principal Discharge DX:	Epigastric pain  Secondary Diagnosis:	Back pain in pregnancy  Secondary Diagnosis:	UTI (urinary tract infection)

## 2019-07-25 NOTE — ED PROVIDER NOTE - PROGRESS NOTE DETAILS
Pt was seen and treated by Dr. Navarro urine show uti and pt 's abd is soft nontedner no montgomery's sign. Pt is given and explained all test reports and advised to follow up with Dr. Magana ob/gyn and return if symptoms persist or worsen. Pt was also advised to follow up Dr. Copeland gastroenterologist.

## 2019-07-25 NOTE — ED PROVIDER NOTE - PHYSICAL EXAMINATION
Gen: Alert, NAD, well appearing  Head: NC, AT, normal lids/conjunctiva  ENT: normal hearing, patent oropharynx without erythema/exudate, uvula midline  Neck: +supple, no JVD, +Trachea midline  Pulm: Bilateral BS, normal resp effort, no wheeze/stridor/retractions  CV: RRR, no M/R/G, +dist pulses  Abd: soft, NT/ND, Negative [place] signs, +BS, no palpable masses  Mskel: no edema/erythema/cyanosis, BL flank tenderness  Skin: no rash, warm/dry  Neuro: AAOx3, no apparent sensory/motor deficits, coordination intact

## 2019-07-25 NOTE — ED ADULT TRIAGE NOTE - CHIEF COMPLAINT QUOTE
Pt returns to ED having been DCd this morning with a UTI. She called the ED and was told to come back in as the Rx given to her is not covered by her insurance and she needs iv abx. No new complaints. Pt is 13 weeks pregnant, LMP  EDC

## 2019-07-25 NOTE — ED ADULT NURSE NOTE - OBJECTIVE STATEMENT
Pt presents to ED c/o ruq pain radiating into the back x a few days. Hx of gallstones, was told she needed surgery but she had not scheduled it yet. She is also pregnant, LMP 19, EDC 20, . Denies vaginal bleeding, hematuria, dysuria, lower pelvic pain, fevers and chills. respirations even and unlabored. skin warm and dry. will continue to monitor awaiting MD estevez and orders.

## 2019-07-25 NOTE — ED ADULT NURSE NOTE - PAIN RATING/NUMBER SCALE (0-10): ACTIVITY
[id number] [employer]. [address] [place] [address]. [phone] [employer]. [address] [place] [address], [place] [address]. All rights reserved. This information is not intended as a substitute for professional medical care. Always follow your healthcare professional's instructions.    -Strep swab is negative today.  -Flonase daily.  -Claritin-D for the next 2-3 days.  -Steroid shot given here today.  -Magic mouthwash as needed for sore throat.  Please follow up with your Primary care provider within 2-5 days if your signs and symptoms have not resolved or worsen.     If your condition worsens or fails to improve we recommend that you receive another evaluation at the emergency room immediately or contact your primary medical clinic to discuss your concerns.   You must understand that you have received an Urgent Care treatment only and that you may be released before all of your medical problems are known or treated. You, the patient, will arrange for follow up care as instructed.        6

## 2019-07-25 NOTE — ED PROVIDER NOTE - PHYSICAL EXAMINATION
Gen: Alert, NAD, well appearing  Head: NC, AT, PERRL, EOMI, normal lids/conjunctiva  ENT: normal hearing, patent oropharynx without erythema/exudate, uvula midline  Neck: +supple, no tenderness/meningismus/JVD, +Trachea midline  Pulm: Bilateral BS, normal resp effort, no wheeze/stridor/retractions  CV: RRR, no M/R/G, +dist pulses  Abd: soft, focal epigastric pain, ND, Negative [place] signs, +BS, no palpable masses  Mskel: no edema/erythema/cyanosis, +back tenderness without sensitivity or guarding  Skin: no rash, warm/dry  Neuro: AAOx3, no apparent sensory/motor deficits, coordination intact  Fetal Assessment: IUP, mobile fetus in transverse lie,  bpm

## 2019-07-25 NOTE — ED PROVIDER NOTE - CLINICAL SUMMARY MEDICAL DECISION MAKING FREE TEXT BOX
Well appearing female with dysuria, flank pain, 13 weeks gestation.  VSS.  Case d/w Dr. Palacio OB/GYN who recommends transfer for fetal assessment since she is in 2nd TM and has h/o high risk pregnancy.  Patient receiving rocephin, consents to transfer.

## 2019-07-25 NOTE — ED PROVIDER NOTE - OBJECTIVE STATEMENT
Pertinent PMH/PSH/FHx/SHx and Review of Systems contained within:  Patient presents to the ED for abdominal and BL flank/back pain. Patient is , 13 weeks by LMP reports midepigastric focal pain since today, not associated with n/v/d, chest pain, or dyspnea.  Also reports BL flank and back pain x3 days, no assoc fever, dysuria, or hematuria.     Relevant PMHx/SHx/SOCHx/FAMH:  , no psh,  birth  Patient denies EtOH/tobacco/illicit substance use.    ROS: No fever/chills, No headache/photophobia/eye pain/changes in vision, No ear pain/sore throat/dysphagia, No chest pain/palpitations, no SOB/cough/wheeze/stridor, No N/V/D/melena, no dysuria/frequency/discharge, No neck pain, no rash, no changes in neurological status/function.

## 2019-07-25 NOTE — ED PROVIDER NOTE - OBJECTIVE STATEMENT
Pertinent PMH/PSH/FHx/SHx and Review of Systems contained within:  Patient presents to the ED for back pain and dysuria.  Patient had been seen in ER last night, well appearing, c/o BL flank pain, UA this am obtained after signout to am physician shows +infection, cefpodoxime was sent to pharmacy which she has been unable to obtain.  Patient recalled to ER for admission of pyelo in pregnancy.  Last night patient had not endorsed dysuria, now she does report it.     Relevant PMHx/SHx/SOCHx/FAMH:  	, no psh,  birth  	Patient denies EtOH/tobacco/illicit substance use.      Relevant PMHx/SHx/SOCHx/FAMH: Pertinent PMH/PSH/FHx/SHx and Review of Systems contained within:  Patient presents to the ED for back pain and dysuria.  Patient had been seen in ER last night, well appearing, c/o BL flank pain, UA this am obtained after signout to am physician shows +infection, cefpodoxime was sent to pharmacy which she has been unable to obtain.  Patient recalled to ER for admission of pyelo in pregnancy.  Last night patient had not endorsed dysuria, now she does report it.     Relevant PMHx/SHx/SOCHx/FAMH:  	, no psh,  birth  	Patient denies EtOH/tobacco/illicit substance use.      ROS: No fever/chills, No headache/photophobia/eye pain/changes in vision, No ear pain/sore throat/dysphagia, No chest pain/palpitations, no SOB/cough/wheeze/stridor, No abdominal pain, No N/V/D/melena, no dysuria/frequency/discharge, No neck pain, no rash, no changes in neurological status/function.

## 2019-07-25 NOTE — ED ADULT NURSE REASSESSMENT NOTE - NS ED NURSE REASSESS COMMENT FT1
pt seen and re-evaluated by ED attending d/c ready in stable condition pt instrutec to  RX from pharmacy and f/u ob/gyn for f/u care

## 2019-07-25 NOTE — ED PROVIDER NOTE - CLINICAL SUMMARY MEDICAL DECISION MAKING FREE TEXT BOX
Patient with epigastric pain, resolved with Maalox, and back pain.  VSS.  Lab values reviewed, there are no values which require acute intervention.  US shows intact pregnancy, US abd without signs of cholecystitis, has known gall bladder stone, not believed to be source of her pain.  Back pain likely msk, however pending UA to r/o pyelo.  Patient results so far all explained in [place]itanian by rolo, patient strongly advised to see OB in 24-48 hrs for reeval since her first pregnancy was high risk.  Patient signed out to incoming physician Dr. Chris.  All decisions regarding the progression of care will be made at their discretion.

## 2019-07-26 ENCOUNTER — EMERGENCY (EMERGENCY)
Facility: HOSPITAL | Age: 24
LOS: 1 days | Discharge: HOME | End: 2019-07-26
Attending: EMERGENCY MEDICINE
Payer: MEDICAID

## 2019-07-26 ENCOUNTER — INPATIENT (INPATIENT)
Facility: HOSPITAL | Age: 24
LOS: 3 days | Discharge: HOME | DRG: 832 | End: 2019-07-30
Attending: SURGERY | Admitting: SURGERY
Payer: MEDICAID

## 2019-07-26 VITALS
RESPIRATION RATE: 17 BRPM | HEIGHT: 63 IN | DIASTOLIC BLOOD PRESSURE: 71 MMHG | WEIGHT: 126.1 LBS | TEMPERATURE: 98 F | HEART RATE: 64 BPM | SYSTOLIC BLOOD PRESSURE: 108 MMHG | OXYGEN SATURATION: 100 %

## 2019-07-26 VITALS
HEIGHT: 63 IN | SYSTOLIC BLOOD PRESSURE: 107 MMHG | RESPIRATION RATE: 16 BRPM | HEART RATE: 56 BPM | TEMPERATURE: 98 F | DIASTOLIC BLOOD PRESSURE: 70 MMHG | OXYGEN SATURATION: 100 % | WEIGHT: 126.1 LBS

## 2019-07-26 VITALS
SYSTOLIC BLOOD PRESSURE: 99 MMHG | DIASTOLIC BLOOD PRESSURE: 72 MMHG | TEMPERATURE: 99 F | RESPIRATION RATE: 16 BRPM | HEART RATE: 54 BPM | OXYGEN SATURATION: 98 %

## 2019-07-26 DIAGNOSIS — Z98.890 OTHER SPECIFIED POSTPROCEDURAL STATES: Chronic | ICD-10-CM

## 2019-07-26 DIAGNOSIS — R10.13 EPIGASTRIC PAIN: ICD-10-CM

## 2019-07-26 LAB
ALBUMIN SERPL ELPH-MCNC: 4.3 G/DL — SIGNIFICANT CHANGE UP (ref 3.3–5)
ALP SERPL-CCNC: 78 U/L — SIGNIFICANT CHANGE UP (ref 40–120)
ALT FLD-CCNC: 25 U/L — SIGNIFICANT CHANGE UP (ref 10–45)
ANION GAP SERPL CALC-SCNC: 16 MMOL/L — SIGNIFICANT CHANGE UP (ref 5–17)
APPEARANCE UR: CLEAR — SIGNIFICANT CHANGE UP
APPEARANCE UR: CLEAR — SIGNIFICANT CHANGE UP
AST SERPL-CCNC: 20 U/L — SIGNIFICANT CHANGE UP (ref 10–40)
BACTERIA # UR AUTO: NEGATIVE — SIGNIFICANT CHANGE UP
BASOPHILS # BLD AUTO: 0.1 K/UL — SIGNIFICANT CHANGE UP (ref 0–0.2)
BASOPHILS NFR BLD AUTO: 0.9 % — SIGNIFICANT CHANGE UP (ref 0–2)
BILIRUB SERPL-MCNC: 0.3 MG/DL — SIGNIFICANT CHANGE UP (ref 0.2–1.2)
BILIRUB UR-MCNC: NEGATIVE — SIGNIFICANT CHANGE UP
BILIRUB UR-MCNC: NEGATIVE — SIGNIFICANT CHANGE UP
BUN SERPL-MCNC: 7 MG/DL — SIGNIFICANT CHANGE UP (ref 7–23)
CALCIUM SERPL-MCNC: 9.2 MG/DL — SIGNIFICANT CHANGE UP (ref 8.4–10.5)
CHLORIDE SERPL-SCNC: 98 MMOL/L — SIGNIFICANT CHANGE UP (ref 96–108)
CO2 SERPL-SCNC: 21 MMOL/L — LOW (ref 22–31)
COLOR SPEC: SIGNIFICANT CHANGE UP
COLOR SPEC: YELLOW — SIGNIFICANT CHANGE UP
CREAT SERPL-MCNC: 0.54 MG/DL — SIGNIFICANT CHANGE UP (ref 0.5–1.3)
CULTURE RESULTS: SIGNIFICANT CHANGE UP
DIFF PNL FLD: NEGATIVE — SIGNIFICANT CHANGE UP
DIFF PNL FLD: NEGATIVE — SIGNIFICANT CHANGE UP
EOSINOPHIL # BLD AUTO: 0 K/UL — SIGNIFICANT CHANGE UP (ref 0–0.5)
EOSINOPHIL NFR BLD AUTO: 0.4 % — SIGNIFICANT CHANGE UP (ref 0–6)
EPI CELLS # UR: 1 /HPF — SIGNIFICANT CHANGE UP
GLUCOSE SERPL-MCNC: 94 MG/DL — SIGNIFICANT CHANGE UP (ref 70–99)
GLUCOSE UR QL: NEGATIVE — SIGNIFICANT CHANGE UP
GLUCOSE UR QL: NEGATIVE — SIGNIFICANT CHANGE UP
HCG SERPL-ACNC: HIGH MIU/ML
HCT VFR BLD CALC: 38 % — SIGNIFICANT CHANGE UP (ref 34.5–45)
HGB BLD-MCNC: 12.8 G/DL — SIGNIFICANT CHANGE UP (ref 11.5–15.5)
HYALINE CASTS # UR AUTO: 1 /LPF — SIGNIFICANT CHANGE UP (ref 0–2)
KETONES UR-MCNC: ABNORMAL
KETONES UR-MCNC: ABNORMAL
LEUKOCYTE ESTERASE UR-ACNC: ABNORMAL
LEUKOCYTE ESTERASE UR-ACNC: ABNORMAL
LIDOCAIN IGE QN: 28 U/L — SIGNIFICANT CHANGE UP (ref 7–60)
LYMPHOCYTES # BLD AUTO: 1.1 K/UL — SIGNIFICANT CHANGE UP (ref 1–3.3)
LYMPHOCYTES # BLD AUTO: 10.2 % — LOW (ref 13–44)
MCHC RBC-ENTMCNC: 29.7 PG — SIGNIFICANT CHANGE UP (ref 27–34)
MCHC RBC-ENTMCNC: 33.8 GM/DL — SIGNIFICANT CHANGE UP (ref 32–36)
MCV RBC AUTO: 87.7 FL — SIGNIFICANT CHANGE UP (ref 80–100)
MONOCYTES # BLD AUTO: 0.5 K/UL — SIGNIFICANT CHANGE UP (ref 0–0.9)
MONOCYTES NFR BLD AUTO: 5 % — SIGNIFICANT CHANGE UP (ref 2–14)
NEUTROPHILS # BLD AUTO: 8.8 K/UL — HIGH (ref 1.8–7.4)
NEUTROPHILS NFR BLD AUTO: 83.4 % — HIGH (ref 43–77)
NITRITE UR-MCNC: NEGATIVE — SIGNIFICANT CHANGE UP
NITRITE UR-MCNC: NEGATIVE — SIGNIFICANT CHANGE UP
PH UR: 6.5 — SIGNIFICANT CHANGE UP (ref 5–8)
PH UR: 7 — SIGNIFICANT CHANGE UP (ref 5–8)
PLATELET # BLD AUTO: 199 K/UL — SIGNIFICANT CHANGE UP (ref 150–400)
POTASSIUM SERPL-MCNC: 3.7 MMOL/L — SIGNIFICANT CHANGE UP (ref 3.5–5.3)
POTASSIUM SERPL-SCNC: 3.7 MMOL/L — SIGNIFICANT CHANGE UP (ref 3.5–5.3)
PROT SERPL-MCNC: 7.2 G/DL — SIGNIFICANT CHANGE UP (ref 6–8.3)
PROT UR-MCNC: ABNORMAL
PROT UR-MCNC: NEGATIVE — SIGNIFICANT CHANGE UP
RBC # BLD: 4.33 M/UL — SIGNIFICANT CHANGE UP (ref 3.8–5.2)
RBC # FLD: 11.6 % — SIGNIFICANT CHANGE UP (ref 10.3–14.5)
RBC CASTS # UR COMP ASSIST: 1 /HPF — SIGNIFICANT CHANGE UP (ref 0–4)
SODIUM SERPL-SCNC: 135 MMOL/L — SIGNIFICANT CHANGE UP (ref 135–145)
SP GR SPEC: 1.01 — SIGNIFICANT CHANGE UP (ref 1.01–1.02)
SP GR SPEC: 1.03 — HIGH (ref 1.01–1.02)
SPECIMEN SOURCE: SIGNIFICANT CHANGE UP
UROBILINOGEN FLD QL: ABNORMAL
UROBILINOGEN FLD QL: NEGATIVE — SIGNIFICANT CHANGE UP
WBC # BLD: 10.5 K/UL — SIGNIFICANT CHANGE UP (ref 3.8–10.5)
WBC # FLD AUTO: 10.5 K/UL — SIGNIFICANT CHANGE UP (ref 3.8–10.5)
WBC UR QL: 4 /HPF — SIGNIFICANT CHANGE UP (ref 0–5)

## 2019-07-26 PROCEDURE — 99283 EMERGENCY DEPT VISIT LOW MDM: CPT

## 2019-07-26 PROCEDURE — 99284 EMERGENCY DEPT VISIT MOD MDM: CPT

## 2019-07-26 PROCEDURE — 76805 OB US >/= 14 WKS SNGL FETUS: CPT | Mod: 26

## 2019-07-26 PROCEDURE — 99285 EMERGENCY DEPT VISIT HI MDM: CPT

## 2019-07-26 PROCEDURE — 81001 URINALYSIS AUTO W/SCOPE: CPT

## 2019-07-26 PROCEDURE — 76817 TRANSVAGINAL US OBSTETRIC: CPT | Mod: 26

## 2019-07-26 PROCEDURE — 87086 URINE CULTURE/COLONY COUNT: CPT

## 2019-07-26 PROCEDURE — 93975 VASCULAR STUDY: CPT | Mod: 26

## 2019-07-26 PROCEDURE — 76775 US EXAM ABDO BACK WALL LIM: CPT

## 2019-07-26 PROCEDURE — 76775 US EXAM ABDO BACK WALL LIM: CPT | Mod: 26

## 2019-07-26 PROCEDURE — 76700 US EXAM ABDOM COMPLETE: CPT | Mod: 26,59

## 2019-07-26 RX ORDER — ACETAMINOPHEN 500 MG
975 TABLET ORAL ONCE
Refills: 0 | Status: COMPLETED | OUTPATIENT
Start: 2019-07-26 | End: 2019-07-26

## 2019-07-26 RX ORDER — CEPHALEXIN 500 MG
1 CAPSULE ORAL
Qty: 40 | Refills: 0
Start: 2019-07-26 | End: 2019-08-04

## 2019-07-26 RX ORDER — SODIUM CHLORIDE 9 MG/ML
1000 INJECTION INTRAMUSCULAR; INTRAVENOUS; SUBCUTANEOUS ONCE
Refills: 0 | Status: COMPLETED | OUTPATIENT
Start: 2019-07-26 | End: 2019-07-26

## 2019-07-26 RX ADMIN — Medication 975 MILLIGRAM(S): at 23:26

## 2019-07-26 RX ADMIN — SODIUM CHLORIDE 1000 MILLILITER(S): 9 INJECTION INTRAMUSCULAR; INTRAVENOUS; SUBCUTANEOUS at 21:57

## 2019-07-26 NOTE — ED ADULT NURSE NOTE - OBJECTIVE STATEMENT
24 year old female transfer from [facility] c/o back pain secondary to dx of pyelonephritis. Patient is currently 13 weeks pregnant with her 2nd pregnancy. As per EMS, patient was seen at [facility] yesterday and was diagnosed with UTI. Patient was discharged home on PO antibiotics yesterday morning and had tried to go  her medications from the pharmacy due to an insurance issue. Patient was then seen at [facility] later and the day and was told she needed IV antibiotics and needed to be transferred to [facility]. Patient has no PMH. Patient at this time, denies CP/SOB, f/c, n/v/d, abdominal pain, dizziness/lightheadedness, numbness/tingling/weakness, vaginal discharge. Patient is a/ox3, VSS, ambulatory, able to move all extremities, sensory in tact, follows commands, PERRL and in NAD. Lung sounds are clear and equal b/l with no labored breathing, abdomen soft, nontender and nondistended, peripheral pulses strong and equal b/l with no edema noted, skin is warm, dry and intact.

## 2019-07-26 NOTE — ED PROVIDER NOTE - OBJECTIVE STATEMENT
Patient  approx 13 weeks pregnant presenting complaining of upper abdominal pains radiating to bilateral flanks.  Began last night and persisting.  Has not taken anything at home for pain.  Seen for same in Emergency Department last night with unremarkable workup and discharged home, representing today because of ongoing pain.  Denying vaginal bleeding or discharge, chronic nausea ongoing with pregnancy but no acute changes, no fevers, no chills.

## 2019-07-26 NOTE — CONSULT NOTE ADULT - ATTENDING COMMENTS
/Attendiny/o  at 13wks by shadio, transferred from [facility] with c/o upper back pain and epigastric pain.    Pt seen at bedside with Resident and examined; I have read and reviewed the above note and agree with the assessment and plan.    Pt with no CVA tenderness on exam, no suprapubic tenderness; no abdominal tenderness.  U/A done at [facility] contaminated and repeated and neg with only some LE noted; Cx pending.  Renal sono done to further evaluate back pain and noted to have Gallstones, otherwise kidneys appear wnl with pregnancy changes.    Pt most likely with pain 2nd to GI causes, but nothing that requires immediate attention as pt is stable at this time.  No acute Gyn pathology.  Pt stable for D/C home and encouraged to f/u with her OB.    Thank you for the consult.  Please call if any further questions.    Dr. Urena

## 2019-07-26 NOTE — ED ADULT NURSE NOTE - OBJECTIVE STATEMENT
25 y/o female with no PMH presents to the ED from home c/o lower back pain. Patient states that she was here yesterday for the same symptoms. States that she has been having lower back pain that radiates to the abdomen. Denies taking any medication for pain.  Denies fever, chills, n/v, weakness, abd pain, diarrhea/constipation, numbness/tingling, urinary s/s. Patient A&Ox3, in no respiratory distress, and denies chest pain. Patient has equal strength and sensation in all 4 extremities. patient ambulated in ED. 25 y/o female with no PMH presents to the ED from home c/o lower back pain. Patient states that she was here yesterday for the same symptoms. Patient is 13 weeks pregnant. States that she has been having lower back pain that radiates to the abdomen. Denies taking any medication for pain. .   Denies fever, chills, n/v, weakness, abd pain, diarrhea/constipation, numbness/tingling, urinary s/s. Patient A&Ox3, in no respiratory distress, and denies chest pain. Patient has equal strength and sensation in all 4 extremities. patient ambulated in ED.

## 2019-07-26 NOTE — ED PROVIDER NOTE - PHYSICAL EXAMINATION
GEN: NAD, awake, well appearing  HEENT: NCAT, MMM, normal conjunctiva, perrl  CHEST/LUNGS: Non-tachypneic, CTAB, bilateral breath sounds  CARDIAC: Non-tachycardic, normal perfusion  ABDOMEN: Soft, NTND, No rebound/guarding  MSK: b/l CVA tenderness, No edema, no gross deformity of extremities  SKIN: No rashes, no petechiae, no vesicles  NEURO: CN grossly intact, normal coordination, no focal motor or sensory deficits  PSYCH: Alert, appropriate, cooperative GEN: NAD, awake, well appearing  HEENT: NCAT, MMM, normal conjunctiva, perrl  CHEST/LUNGS: Non-tachypneic, CTAB, bilateral breath sounds  CARDIAC: Non-tachycardic, normal perfusion  ABDOMEN: Soft, NTND, No rebound/guarding  MSK: b/l CVA tenderness, No edema, no gross deformity of extremities  SKIN: No rashes, no petechiae, no vesicles  NEURO: CN grossly intact, normal coordination, no focal motor or sensory deficits  PSYCH: Alert, appropriate, cooperative  FHR: 147

## 2019-07-26 NOTE — ED PROVIDER NOTE - CLINICAL SUMMARY MEDICAL DECISION MAKING FREE TEXT BOX
Patient with ongoing abdominal pains in pregnancy, does not appear to be pyelo/renal colic, ? biliary etiology - plan for labs, HCG, LFTs, US, re-evaluate.

## 2019-07-26 NOTE — ED PROVIDER NOTE - PROGRESS NOTE DETAILS
called pharmacy who states keflex prescription covered by her insurance discussed with ob about ultrasound results. cleared for outpatient f/u

## 2019-07-26 NOTE — ED PROVIDER NOTE - ATTENDING CONTRIBUTION TO CARE
Patient seen and evaluated with resident/NP/PA, however HPI, ROS, PE and MDM as documented authored by myself unless otherwise noted- Liborio Dominguez MD

## 2019-07-26 NOTE — ED PROVIDER NOTE - OBJECTIVE STATEMENT
24F with no pmh  13 weeks pregnant by US presenting with back pain several days seen at [facility] with positive UTI. Patient states she was prescribed PO abx and discharged yesterday morning however, had trouble picking up her abx due to insurance issues. States she called back and was told to come back to hospital for admission and IV abx. Patient was seen again at [facility] with decision for transfer after discussing with their OBGYN team. Denies fever, chills, cp, sob, n/v/d, dizziness, dysuria, hematuria 24F with no pmh  13 weeks pregnant by US presenting with back pain several days seen at [facility] with positive UTI. Patient states she was prescribed PO abx and discharged yesterday morning however, had trouble picking up her abx due to insurance issues. States she called back and was told to come back to hospital for admission and IV abx. Patient was seen again at [facility] with decision for transfer after discussing with their OBGYN team. Denies fever, chills, cp, sob, n/v/d, dizziness, hematuria, vaginal bleeding or discharge

## 2019-07-26 NOTE — CONSULT NOTE ADULT - ASSESSMENT
23yo  @ 13w p/w upper back pain and epigastric pain likely 2/2 gas and stretching from pregnancy. UA from [place] contaminated. Low concern for pyelo at this time. No need to treat for UTI given contaminated UA and asymptomatic patient unless repeat UA/UCx +.

## 2019-07-26 NOTE — ED PROVIDER NOTE - NS ED ROS FT
GENERAL: No fever or chills  EYES: no change in vision  HEENT: no trouble swallowing or speaking  CARDIAC: no chest pain or palpitations  PULMONARY: no cough or SOB  GI: abdominal pain, no nausea, vomiting, diarrhea, or constipation   : No changes in urination  SKIN: no rashes  NEURO: no headache, numbness, or weakness  MSK: back pain, No joint pain

## 2019-07-26 NOTE — ED PROVIDER NOTE - ATTENDING CONTRIBUTION TO CARE
attending Madhu: 24yF  at 13 weeks gestation transferred from [facility] for OBGYN evaluation for pyelonephritis. Was seen at [facility] ER yesterday, unable to fill script for PO abx due to insurance issues. Tolerating PO. No fevers. No vaginal bleeding. Abdomen soft/NT. Will review OSH records, OBGYN evaluation, reassess

## 2019-07-26 NOTE — CONSULT NOTE ADULT - SUBJECTIVE AND OBJECTIVE BOX
LISA AYALA  24y  Female [id number]    HPI: 25yo  at 13w by priscilla presents from [place] with epigastric pain and upper back pain that has lasted for a week. No recent hematuria, dysuria, frequency, urgency. No fevers, chills, N/V/D, abdominal pain. No other complaints.        Name of GYN Physician: unknown, [place]    POB:   @ 36w 2016    Pgyn: Denies fibroids, cysts, endometriosis, STI's, Abnormal pap smears     Home meds: PNV    Allergies  No Known Allergies    PAST MEDICAL & SURGICAL HISTORY:  No pertinent past medical history  No significant past surgical history      FAMILY HISTORY: denies breast or ovarian cancer      Social History:  Denies smoking use, drug use, alcohol use.       Vital Signs Last 24 Hrs  T(C): 36.9 (2019 00:50), Max: 36.9 (2019 00:50)  T(F): 98.4 (2019 00:50), Max: 98.4 (2019 00:50)  HR: 56 (2019 00:50) (56 - 56)  BP: 107/70 (2019 00:50) (107/70 - 107/70)  BP(mean): --  RR: 16 (2019 00:50) (16 - 16)  SpO2: 100% (2019 00:50) (100% - 100%)    Physical Exam:   General: sitting comftorably in bed, NAD   Back: No CVA tenderness  Abd: Soft, non-tender, non-distended.      LABS:      Urinalysis Basic - ( 2019 03:14 )    Color: Light Yellow / Appearance: Clear / S.010 / pH: x  Gluc: x / Ketone: Small  / Bili: Negative / Urobili: Negative   Blood: x / Protein: Negative / Nitrite: Negative   Leuk Esterase: Large / RBC: 1 /hpf / WBC 4 /HPF   Sq Epi: x / Non Sq Epi: 1 /hpf / Bacteria: Negative LISA AYALA  24y  Female [id number]    HPI: 23yo  at 13w6d by priscilla presents from [place] with epigastric pain and upper back pain that has lasted for a week. No recent hematuria, dysuria, frequency, urgency. No fevers, chills, N/V/D, abdominal pain. No other complaints.        Name of GYN Physician: unknown, [place]    POB:   @ 36w 2016    Pgyn: Denies fibroids, cysts, endometriosis, STI's, Abnormal pap smears     Home meds: PNV    Allergies  No Known Allergies    PAST MEDICAL & SURGICAL HISTORY:  No pertinent past medical history  No significant past surgical history      FAMILY HISTORY: denies breast or ovarian cancer      Social History:  Denies smoking use, drug use, alcohol use.       Vital Signs Last 24 Hrs  T(C): 36.9 (2019 00:50), Max: 36.9 (2019 00:50)  T(F): 98.4 (2019 00:50), Max: 98.4 (2019 00:50)  HR: 56 (2019 00:50) (56 - 56)  BP: 107/70 (2019 00:50) (107/70 - 107/70)  BP(mean): --  RR: 16 (2019 00:50) (16 - 16)  SpO2: 100% (2019 00:50) (100% - 100%)    Physical Exam:   General: sitting comftorably in bed, NAD   Back: No CVA tenderness  Abd: Soft, non-tender, non-distended.      LABS:      Urinalysis Basic - ( 2019 03:14 )    Color: Light Yellow / Appearance: Clear / S.010 / pH: x  Gluc: x / Ketone: Small  / Bili: Negative / Urobili: Negative   Blood: x / Protein: Negative / Nitrite: Negative   Leuk Esterase: Large / RBC: 1 /hpf / WBC 4 /HPF   Sq Epi: x / Non Sq Epi: 1 /hpf / Bacteria: Negative

## 2019-07-26 NOTE — ED ADULT NURSE NOTE - NSIMPLEMENTINTERV_GEN_ALL_ED
Implemented All Universal Safety Interventions:  [place] to call system. Call bell, personal items and telephone within reach. Instruct patient to call for assistance. Room bathroom lighting operational. Non-slip footwear when patient is off stretcher. Physically safe environment: no spills, clutter or unnecessary equipment. Stretcher in lowest position, wheels locked, appropriate side rails in place.

## 2019-07-26 NOTE — ED PROVIDER NOTE - NSFOLLOWUPINSTRUCTIONS_ED_ALL_ED_FT
Follow up with your primary care doctor and OBGYN   Take antibiotics as prescribed   Return to ED for any new or worsening symptoms

## 2019-07-27 DIAGNOSIS — K81.9 CHOLECYSTITIS, UNSPECIFIED: ICD-10-CM

## 2019-07-27 LAB
ALBUMIN SERPL ELPH-MCNC: 3.5 G/DL — SIGNIFICANT CHANGE UP (ref 3.3–5)
ALP SERPL-CCNC: 68 U/L — SIGNIFICANT CHANGE UP (ref 40–120)
ALT FLD-CCNC: 21 U/L — SIGNIFICANT CHANGE UP (ref 10–45)
ANION GAP SERPL CALC-SCNC: 12 MMOL/L — SIGNIFICANT CHANGE UP (ref 5–17)
ANION GAP SERPL CALC-SCNC: 14 MMOL/L — SIGNIFICANT CHANGE UP (ref 5–17)
AST SERPL-CCNC: 16 U/L — SIGNIFICANT CHANGE UP (ref 10–40)
BASOPHILS # BLD AUTO: 0 K/UL — SIGNIFICANT CHANGE UP (ref 0–0.2)
BASOPHILS NFR BLD AUTO: 0.2 % — SIGNIFICANT CHANGE UP (ref 0–2)
BILIRUB SERPL-MCNC: 0.5 MG/DL — SIGNIFICANT CHANGE UP (ref 0.2–1.2)
BUN SERPL-MCNC: 4 MG/DL — LOW (ref 7–23)
BUN SERPL-MCNC: <4 MG/DL — LOW (ref 7–23)
CALCIUM SERPL-MCNC: 8.9 MG/DL — SIGNIFICANT CHANGE UP (ref 8.4–10.5)
CALCIUM SERPL-MCNC: 8.9 MG/DL — SIGNIFICANT CHANGE UP (ref 8.4–10.5)
CHLORIDE SERPL-SCNC: 101 MMOL/L — SIGNIFICANT CHANGE UP (ref 96–108)
CHLORIDE SERPL-SCNC: 104 MMOL/L — SIGNIFICANT CHANGE UP (ref 96–108)
CO2 SERPL-SCNC: 20 MMOL/L — LOW (ref 22–31)
CO2 SERPL-SCNC: 21 MMOL/L — LOW (ref 22–31)
CREAT SERPL-MCNC: 0.43 MG/DL — LOW (ref 0.5–1.3)
CREAT SERPL-MCNC: 0.49 MG/DL — LOW (ref 0.5–1.3)
CULTURE RESULTS: NO GROWTH — SIGNIFICANT CHANGE UP
EOSINOPHIL # BLD AUTO: 0.1 K/UL — SIGNIFICANT CHANGE UP (ref 0–0.5)
EOSINOPHIL NFR BLD AUTO: 0.9 % — SIGNIFICANT CHANGE UP (ref 0–6)
GLUCOSE SERPL-MCNC: 113 MG/DL — HIGH (ref 70–99)
GLUCOSE SERPL-MCNC: 82 MG/DL — SIGNIFICANT CHANGE UP (ref 70–99)
HCT VFR BLD CALC: 34.5 % — SIGNIFICANT CHANGE UP (ref 34.5–45)
HGB BLD-MCNC: 12 G/DL — SIGNIFICANT CHANGE UP (ref 11.5–15.5)
LYMPHOCYTES # BLD AUTO: 1.4 K/UL — SIGNIFICANT CHANGE UP (ref 1–3.3)
LYMPHOCYTES # BLD AUTO: 17.2 % — SIGNIFICANT CHANGE UP (ref 13–44)
MCHC RBC-ENTMCNC: 30.4 PG — SIGNIFICANT CHANGE UP (ref 27–34)
MCHC RBC-ENTMCNC: 34.8 GM/DL — SIGNIFICANT CHANGE UP (ref 32–36)
MCV RBC AUTO: 87.5 FL — SIGNIFICANT CHANGE UP (ref 80–100)
MONOCYTES # BLD AUTO: 0.4 K/UL — SIGNIFICANT CHANGE UP (ref 0–0.9)
MONOCYTES NFR BLD AUTO: 5.2 % — SIGNIFICANT CHANGE UP (ref 2–14)
NEUTROPHILS # BLD AUTO: 6.4 K/UL — SIGNIFICANT CHANGE UP (ref 1.8–7.4)
NEUTROPHILS NFR BLD AUTO: 76.5 % — SIGNIFICANT CHANGE UP (ref 43–77)
PLATELET # BLD AUTO: 182 K/UL — SIGNIFICANT CHANGE UP (ref 150–400)
POTASSIUM SERPL-MCNC: 3.4 MMOL/L — LOW (ref 3.5–5.3)
POTASSIUM SERPL-MCNC: 3.8 MMOL/L — SIGNIFICANT CHANGE UP (ref 3.5–5.3)
POTASSIUM SERPL-SCNC: 3.4 MMOL/L — LOW (ref 3.5–5.3)
POTASSIUM SERPL-SCNC: 3.8 MMOL/L — SIGNIFICANT CHANGE UP (ref 3.5–5.3)
PROT SERPL-MCNC: 6.3 G/DL — SIGNIFICANT CHANGE UP (ref 6–8.3)
RBC # BLD: 3.94 M/UL — SIGNIFICANT CHANGE UP (ref 3.8–5.2)
RBC # FLD: 11.4 % — SIGNIFICANT CHANGE UP (ref 10.3–14.5)
SODIUM SERPL-SCNC: 134 MMOL/L — LOW (ref 135–145)
SODIUM SERPL-SCNC: 138 MMOL/L — SIGNIFICANT CHANGE UP (ref 135–145)
SPECIMEN SOURCE: SIGNIFICANT CHANGE UP
WBC # BLD: 8.4 K/UL — SIGNIFICANT CHANGE UP (ref 3.8–10.5)
WBC # FLD AUTO: 8.4 K/UL — SIGNIFICANT CHANGE UP (ref 3.8–10.5)

## 2019-07-27 PROCEDURE — 47562 LAPAROSCOPIC CHOLECYSTECTOMY: CPT

## 2019-07-27 PROCEDURE — 74181 MRI ABDOMEN W/O CONTRAST: CPT | Mod: 26

## 2019-07-27 RX ORDER — SODIUM CHLORIDE 9 MG/ML
1000 INJECTION, SOLUTION INTRAVENOUS
Refills: 0 | Status: DISCONTINUED | OUTPATIENT
Start: 2019-07-27 | End: 2019-07-29

## 2019-07-27 RX ORDER — SODIUM CHLORIDE 9 MG/ML
1000 INJECTION, SOLUTION INTRAVENOUS
Refills: 0 | Status: DISCONTINUED | OUTPATIENT
Start: 2019-07-27 | End: 2019-07-27

## 2019-07-27 RX ORDER — POTASSIUM CHLORIDE 20 MEQ
10 PACKET (EA) ORAL
Refills: 0 | Status: COMPLETED | OUTPATIENT
Start: 2019-07-27 | End: 2019-07-27

## 2019-07-27 RX ORDER — ACETAMINOPHEN 500 MG
650 TABLET ORAL EVERY 6 HOURS
Refills: 0 | Status: DISCONTINUED | OUTPATIENT
Start: 2019-07-27 | End: 2019-07-30

## 2019-07-27 RX ORDER — PIPERACILLIN AND TAZOBACTAM 4; .5 G/20ML; G/20ML
3.75 INJECTION, POWDER, LYOPHILIZED, FOR SOLUTION INTRAVENOUS EVERY 8 HOURS
Refills: 0 | Status: DISCONTINUED | OUTPATIENT
Start: 2019-07-27 | End: 2019-07-29

## 2019-07-27 RX ORDER — ENOXAPARIN SODIUM 100 MG/ML
40 INJECTION SUBCUTANEOUS DAILY
Refills: 0 | Status: DISCONTINUED | OUTPATIENT
Start: 2019-07-27 | End: 2019-07-30

## 2019-07-27 RX ADMIN — PIPERACILLIN AND TAZOBACTAM 25 GRAM(S): 4; .5 INJECTION, POWDER, LYOPHILIZED, FOR SOLUTION INTRAVENOUS at 09:36

## 2019-07-27 RX ADMIN — Medication 650 MILLIGRAM(S): at 04:49

## 2019-07-27 RX ADMIN — Medication 650 MILLIGRAM(S): at 10:38

## 2019-07-27 RX ADMIN — Medication 100 MILLIEQUIVALENT(S): at 18:14

## 2019-07-27 RX ADMIN — Medication 100 MILLIEQUIVALENT(S): at 13:03

## 2019-07-27 RX ADMIN — Medication 100 MILLIEQUIVALENT(S): at 15:03

## 2019-07-27 RX ADMIN — PIPERACILLIN AND TAZOBACTAM 25 GRAM(S): 4; .5 INJECTION, POWDER, LYOPHILIZED, FOR SOLUTION INTRAVENOUS at 18:19

## 2019-07-27 RX ADMIN — SODIUM CHLORIDE 100 MILLILITER(S): 9 INJECTION, SOLUTION INTRAVENOUS at 12:59

## 2019-07-27 RX ADMIN — Medication 650 MILLIGRAM(S): at 09:44

## 2019-07-27 RX ADMIN — Medication 650 MILLIGRAM(S): at 04:04

## 2019-07-27 RX ADMIN — SODIUM CHLORIDE 100 MILLILITER(S): 9 INJECTION, SOLUTION INTRAVENOUS at 03:50

## 2019-07-27 RX ADMIN — Medication 1 TABLET(S): at 13:00

## 2019-07-27 RX ADMIN — ENOXAPARIN SODIUM 40 MILLIGRAM(S): 100 INJECTION SUBCUTANEOUS at 13:04

## 2019-07-27 NOTE — H&P ADULT - ASSESSMENT
24F w/ pmh of cholelithiasis, currently 14 weeks pregnant presenting complaining of RUQ/epigastric pain radiating to bilateral flanks w/ US findings consistent with acute cholecystitis    - Admit to surgery: Dr. Moeller  - David: Zosyn  - MRCP to evaluate CBD dilation  - NPO/IVF  - Pain control  - Discussed with Dr. Sunni Ga, PGY2  General Surgery, [phone]

## 2019-07-27 NOTE — CONSULT NOTE ADULT - ASSESSMENT
24F w/ pmh of cholelithiasis, currently 14 weeks pregnant presenting complaining of RUQ/epigastric pain radiating to bilateral flanks w/ US findings consistent with acute cholecystitis    - Recommend antibiotics   - NPO/IVF  - Pain control  - Will discuss with Dr. Sunni Ga, PGY2  General Surgery, [phone]

## 2019-07-27 NOTE — H&P ADULT - HISTORY OF PRESENT ILLNESS
[facility] General Surgery H&P    Patient is a 24y old Female who presents with a chief complaint of abdominal pain    HPI:    24F w/ pmh of cholelithiasis, currently 14 weeks pregnant presenting complaining of RUQ/epigastric pain radiating to bilateral flanks. States back pain began 4 days ago and abdominal pain began 2 days ago and has been persisting. Seen for same complaints at [place] 1 day ago and then transferred to [facility] in setting of UTI possibly requiring admission to OB/GYN service. Was seen in emergency department last night and discharged home with antibiotics for UTI, now presenting today because of ongoing pain. States pain is aggravated by spicy foods, has had similar episode 6 months ago and was diagnosed with cholecystitis, for which she was offered an operation but declined and opted for conservative management with antibiotics because she didn't have someone to take care of her child. Reports having nausea. Denies fevers, chills and changes in bowel habits.      PAST MEDICAL & SURGICAL HISTORY:  No pertinent past medical history  No significant past surgical history      FAMILY HISTORY:      SOCIAL HISTORY:    MEDICATIONS  (STANDING):    MEDICATIONS  (PRN):    Allergies    No Known Allergies    Intolerances        Vital Signs Last 24 Hrs  T(C): 36.7 (2019 22:17), Max: 37.2 (2019 05:02)  T(F): 98 (2019 22:17), Max: 99 (2019 05:02)  HR: 60 (2019 22:17) (54 - 64)  BP: 107/74 (2019 22:17) (99/72 - 108/71)  BP(mean): --  RR: 18 (2019 22:17) (16 - 18)  SpO2: 100% (2019 22:17) (98% - 100%)  Daily Height in cm: 160.02 (2019 20:41)    Daily     General: NAD, well-nourished  HEENT: Atraumatic, EOMI  Resp: Breathing comfortably on RA  CV: Normal sinus rhythm  Abd: soft, ND, tender in RUQ>epiastrium  Ext: ROMIx4, motor strength intact x 4                          12.8   10.5  )-----------( 199      ( 2019 21:54 )             38.0     07-26    135  |  98  |  7   ----------------------------<  94  3.7   |  21<L>  |  0.54    Ca    9.2      2019 21:54    TPro  7.2  /  Alb  4.3  /  TBili  0.3  /  DBili  x   /  AST  20  /  ALT  25  /  AlkPhos  78        Urinalysis Basic - ( 2019 22:03 )    Color: Yellow / Appearance: Clear / S.029 / pH: x  Gluc: x / Ketone: Moderate  / Bili: Negative / Urobili: 2 mg/dL   Blood: x / Protein: Trace / Nitrite: Negative   Leuk Esterase: Large / RBC: 7 /hpf / WBC 22 /HPF   Sq Epi: x / Non Sq Epi: 3 /hpf / Bacteria: Negative        Radiographic Findings:       < from: US Abdomen Complete (19 @ 23:08) >  IMPRESSION:     Cholelithiasis, gallbladder wall thickening and positive sonographic   Carranza sign, constellation of findings suggestive of acute cholecystitis.   Recommend clinical correlation.   CBD is mildly dilated measuring up to 0.5 cm, visualized portions do not   demonstrate choledocholithiasis, an MRCP could be performed if clinically   warranted.

## 2019-07-27 NOTE — CHART NOTE - NSCHARTNOTEFT_GEN_A_CORE
ED called re: Ms. Robertson re-visiting ED tonight. Pt seen last night for r/o pyelo and found to have cholelithiasis. Presents tonight symptomatic with epigastric pain. Gen surg consulted.  GYN aware of patient and will follow.  No issues with patient going to OR for cholecystectomy if surgery deems it necessary. Patient will need an FHR check pre and post-op.  Only oxy or morphine and tylenol for analgesia; no nsaids in early pregnancy.    BRIGHT Valentin PGY2  d/w Dr. Carlos ED called re: Ms. Robertson re-visiting ED tonight. Pt seen last night for r/o pyelo and found to have cholelithiasis. Presents tonight symptomatic with epigastric pain. Gen surg consulted.  GYN aware of patient and will follow.  No issues with patient going to OR for cholecystectomy if surgery deems it necessary. Patient will need an FHR check pre and post-op.  Only oxy or morphine and tylenol for analgesia; no nsaids in early pregnancy.    H Barbie PGY2  d/w Dr. Carlos    ATTG on service note    Reviewed chart from prior GYN ED consult and agree with above PGY2 note.    THOMAS Carlos

## 2019-07-28 LAB
ALBUMIN SERPL ELPH-MCNC: 3.8 G/DL — SIGNIFICANT CHANGE UP (ref 3.3–5)
ALP SERPL-CCNC: 70 U/L — SIGNIFICANT CHANGE UP (ref 40–120)
ALT FLD-CCNC: 22 U/L — SIGNIFICANT CHANGE UP (ref 10–45)
ANION GAP SERPL CALC-SCNC: 12 MMOL/L — SIGNIFICANT CHANGE UP (ref 5–17)
ANION GAP SERPL CALC-SCNC: 13 MMOL/L — SIGNIFICANT CHANGE UP (ref 5–17)
AST SERPL-CCNC: 16 U/L — SIGNIFICANT CHANGE UP (ref 10–40)
BILIRUB SERPL-MCNC: 0.9 MG/DL — SIGNIFICANT CHANGE UP (ref 0.2–1.2)
BUN SERPL-MCNC: 4 MG/DL — LOW (ref 7–23)
BUN SERPL-MCNC: 5 MG/DL — LOW (ref 7–23)
CALCIUM SERPL-MCNC: 8.9 MG/DL — SIGNIFICANT CHANGE UP (ref 8.4–10.5)
CALCIUM SERPL-MCNC: 8.9 MG/DL — SIGNIFICANT CHANGE UP (ref 8.4–10.5)
CHLORIDE SERPL-SCNC: 100 MMOL/L — SIGNIFICANT CHANGE UP (ref 96–108)
CHLORIDE SERPL-SCNC: 99 MMOL/L — SIGNIFICANT CHANGE UP (ref 96–108)
CO2 SERPL-SCNC: 21 MMOL/L — LOW (ref 22–31)
CO2 SERPL-SCNC: 23 MMOL/L — SIGNIFICANT CHANGE UP (ref 22–31)
CREAT SERPL-MCNC: 0.56 MG/DL — SIGNIFICANT CHANGE UP (ref 0.5–1.3)
CREAT SERPL-MCNC: 0.59 MG/DL — SIGNIFICANT CHANGE UP (ref 0.5–1.3)
CULTURE RESULTS: SIGNIFICANT CHANGE UP
GLUCOSE SERPL-MCNC: 100 MG/DL — HIGH (ref 70–99)
GLUCOSE SERPL-MCNC: 107 MG/DL — HIGH (ref 70–99)
HCT VFR BLD CALC: 35.1 % — SIGNIFICANT CHANGE UP (ref 34.5–45)
HGB BLD-MCNC: 12.2 G/DL — SIGNIFICANT CHANGE UP (ref 11.5–15.5)
MAGNESIUM SERPL-MCNC: 1.8 MG/DL — SIGNIFICANT CHANGE UP (ref 1.6–2.6)
MCHC RBC-ENTMCNC: 30.3 PG — SIGNIFICANT CHANGE UP (ref 27–34)
MCHC RBC-ENTMCNC: 34.7 GM/DL — SIGNIFICANT CHANGE UP (ref 32–36)
MCV RBC AUTO: 87.2 FL — SIGNIFICANT CHANGE UP (ref 80–100)
PHOSPHATE SERPL-MCNC: 3.7 MG/DL — SIGNIFICANT CHANGE UP (ref 2.5–4.5)
PLATELET # BLD AUTO: 205 K/UL — SIGNIFICANT CHANGE UP (ref 150–400)
POTASSIUM SERPL-MCNC: 3.5 MMOL/L — SIGNIFICANT CHANGE UP (ref 3.5–5.3)
POTASSIUM SERPL-MCNC: 4 MMOL/L — SIGNIFICANT CHANGE UP (ref 3.5–5.3)
POTASSIUM SERPL-SCNC: 3.5 MMOL/L — SIGNIFICANT CHANGE UP (ref 3.5–5.3)
POTASSIUM SERPL-SCNC: 4 MMOL/L — SIGNIFICANT CHANGE UP (ref 3.5–5.3)
PROT SERPL-MCNC: 6.6 G/DL — SIGNIFICANT CHANGE UP (ref 6–8.3)
RBC # BLD: 4.03 M/UL — SIGNIFICANT CHANGE UP (ref 3.8–5.2)
RBC # FLD: 11.4 % — SIGNIFICANT CHANGE UP (ref 10.3–14.5)
SODIUM SERPL-SCNC: 134 MMOL/L — LOW (ref 135–145)
SODIUM SERPL-SCNC: 134 MMOL/L — LOW (ref 135–145)
SPECIMEN SOURCE: SIGNIFICANT CHANGE UP
WBC # BLD: 6.3 K/UL — SIGNIFICANT CHANGE UP (ref 3.8–10.5)
WBC # FLD AUTO: 6.3 K/UL — SIGNIFICANT CHANGE UP (ref 3.8–10.5)

## 2019-07-28 RX ORDER — MAGNESIUM SULFATE 500 MG/ML
2 VIAL (ML) INJECTION ONCE
Refills: 0 | Status: COMPLETED | OUTPATIENT
Start: 2019-07-28 | End: 2019-07-28

## 2019-07-28 RX ORDER — POTASSIUM CHLORIDE 20 MEQ
10 PACKET (EA) ORAL
Refills: 0 | Status: DISCONTINUED | OUTPATIENT
Start: 2019-07-28 | End: 2019-07-28

## 2019-07-28 RX ORDER — MAGNESIUM SULFATE 500 MG/ML
0.16 VIAL (ML) INJECTION
Qty: 20 | Refills: 0 | Status: DISCONTINUED | OUTPATIENT
Start: 2019-07-28 | End: 2019-07-28

## 2019-07-28 RX ORDER — POTASSIUM CHLORIDE 20 MEQ
20 PACKET (EA) ORAL
Refills: 0 | Status: COMPLETED | OUTPATIENT
Start: 2019-07-28 | End: 2019-07-28

## 2019-07-28 RX ADMIN — Medication 650 MILLIGRAM(S): at 12:37

## 2019-07-28 RX ADMIN — PIPERACILLIN AND TAZOBACTAM 25 GRAM(S): 4; .5 INJECTION, POWDER, LYOPHILIZED, FOR SOLUTION INTRAVENOUS at 01:31

## 2019-07-28 RX ADMIN — Medication 50 GRAM(S): at 14:19

## 2019-07-28 RX ADMIN — PIPERACILLIN AND TAZOBACTAM 25 GRAM(S): 4; .5 INJECTION, POWDER, LYOPHILIZED, FOR SOLUTION INTRAVENOUS at 09:14

## 2019-07-28 RX ADMIN — Medication 20 MILLIEQUIVALENT(S): at 14:20

## 2019-07-28 RX ADMIN — Medication 20 MILLIEQUIVALENT(S): at 18:10

## 2019-07-28 RX ADMIN — PIPERACILLIN AND TAZOBACTAM 25 GRAM(S): 4; .5 INJECTION, POWDER, LYOPHILIZED, FOR SOLUTION INTRAVENOUS at 18:08

## 2019-07-28 RX ADMIN — Medication 650 MILLIGRAM(S): at 11:53

## 2019-07-28 RX ADMIN — Medication 20 MILLIEQUIVALENT(S): at 16:08

## 2019-07-28 RX ADMIN — ENOXAPARIN SODIUM 40 MILLIGRAM(S): 100 INJECTION SUBCUTANEOUS at 11:51

## 2019-07-28 RX ADMIN — SODIUM CHLORIDE 100 MILLILITER(S): 9 INJECTION, SOLUTION INTRAVENOUS at 01:32

## 2019-07-28 RX ADMIN — Medication 1 TABLET(S): at 11:50

## 2019-07-28 RX ADMIN — SODIUM CHLORIDE 100 MILLILITER(S): 9 INJECTION, SOLUTION INTRAVENOUS at 09:15

## 2019-07-28 NOTE — PROGRESS NOTE ADULT - ASSESSMENT
25 yo P0 at 13w2d admitted for IV antibiotics and observation in the setting of cholecystitis, stable.    Plan:  -Adv. to regular diet  -Continue IV abx  -Replete electrolytes  -monitor abdominal exams  -D/c home on abx

## 2019-07-28 NOTE — PROGRESS NOTE ADULT - SUBJECTIVE AND OBJECTIVE BOX
24F w/ pmh of cholelithiasis, currently 14 weeks pregnant presenting complaining of RUQ/epigastric pain radiating to bilateral flanks. States back pain began 4 days ago and abdominal pain began 2 days ago and has been persisting. Seen for same complaints at [place] 1 day ago and then transferred to [facility] in setting of UTI possibly requiring admission to OB/GYN service. Was seen in emergency department last night and discharged home with antibiotics for UTI, now presenting today because of ongoing pain. States pain is aggravated by spicy foods, has had similar episode 6 months ago and was diagnosed with cholecystitis, for which she was offered an operation but declined and opted for conservative management with antibiotics because she didn't have someone to take care of her child. Reports having nausea. Denies fevers, chills and changes in bowel habits.    SUBJECTIVE: Pt seen and examined at the bedside. She appears comfortable and pain controlled. She is refusing surgery at this time, mainly due to her current pregnancy.      Vital Signs Last 24 Hrs  T(C): 36.9 (2019 13:38), Max: 37.1 (2019 21:07)  T(F): 98.4 (2019 13:38), Max: 98.7 (2019 21:07)  HR: 56 (2019 13:38) (56 - 61)  BP: 92/52 (2019 13:38) (79/51 - 96/58)  BP(mean): --  RR: 16 (2019 13:38) (16 - 18)  SpO2: 99% (2019 13:38) (97% - 100%)    I&O's Summary    2019 07:01  -  2019 07:00  --------------------------------------------------------  IN: 1390 mL / OUT: 0 mL / NET: 1390 mL        Physical Exam:  General Appearance: Appears well, NAD  Neck: Supple  Chest: Equal expansion bilaterally, equal breath sounds  CV: Pulse regular presently  Abdomen: Soft, nontense, nontender  Extremities: Grossly symmetric, SCD's in place     LABS:                        12.2   6.3   )-----------( 205      ( 2019 06:48 )             35.1         134<L>  |  100  |  4<L>  ----------------------------<  107<H>  3.5   |  21<L>  |  0.59    Ca    8.9      2019 06:48  Phos  3.7       Mg     1.8         TPro  6.6  /  Alb  3.8  /  TBili  0.9  /  DBili  x   /  AST  16  /  ALT  22  /  AlkPhos  70        Urinalysis Basic - ( 2019 22:03 )    Color: Yellow / Appearance: Clear / S.029 / pH: x  Gluc: x / Ketone: Moderate  / Bili: Negative / Urobili: 2 mg/dL   Blood: x / Protein: Trace / Nitrite: Negative   Leuk Esterase: Large / RBC: 7 /hpf / WBC 22 /HPF   Sq Epi: x / Non Sq Epi: 3 /hpf / Bacteria: Negative        RADIOLOGY & ADDITIONAL STUDIES:    Oliverio Renteria MD  Surgery Resident

## 2019-07-28 NOTE — CHART NOTE - NSCHARTNOTEFT_GEN_A_CORE
MFM Fellow    Pt seen at bedside w/ MFM attending Dr. Newton and PGY3 Nadeem. Pt reports improvement in her abdominal discomfort and denies fever/chills, nausea or vomiting.  The patient reports having established prenatal care w/ Dr. Kayode Day at [place]. She does state that now that she has moved to [place] that she may desire transfer of care.    Vital Signs Last 24 Hrs  T(C): 36.7 (2019 09:15), Max: 37.1 (2019 21:07)  T(F): 98.1 (2019 09:15), Max: 98.7 (2019 21:07)  HR: 60 (2019 09:15) (55 - 61)  BP: 93/55 (2019 09:15) (79/51 - 96/60)  BP(mean): --  RR: 16 (2019 09:15) (16 - 18)  SpO2: 97% (2019 09:15) (97% - 100%)                          12.2   6.3   )-----------( 205      ( 2019 06:48 )             35.1   07-28    134<L>  |  100  |  4<L>  ----------------------------<  107<H>  3.5   |  21<L>  |  0.59    Ca    8.9      2019 06:48  Phos  3.7     07-28  Mg     1.8     -    TPro  6.6  /  Alb  3.8  /  TBili  0.9  /  DBili  x   /  AST  16  /  ALT  22  /  AlkPhos  70  07-28    Urinalysis Basic - ( 2019 22:03 )    Color: Yellow / Appearance: Clear / S.029 / pH: x  Gluc: x / Ketone: Moderate  / Bili: Negative / Urobili: 2 mg/dL   Blood: x / Protein: Trace / Nitrite: Negative   Leuk Esterase: Large / RBC: 7 /hpf / WBC 22 /HPF   Sq Epi: x / Non Sq Epi: 3 /hpf / Bacteria: Negative    < from: US OB >=14 Weeks, First Gestation (19 @ 23:08) >    EXAM:  US OB TRANSVAGINAL                          EXAM:  US OB GRT THAN 14 WKS 1ST GEST                          EXAM:  US DPLX PELVIC                            PROCEDURE DATE:  2019      INTERPRETATION:  CLINICAL INFORMATION: Abdominal pain.    LMP: 2019    Estimated Gestational Age by LMP: 14 weeks and 1 day    COMPARISON: None available.    Endovaginal and transabdominal pelvic sonogram. Color and Spectral   Doppler was performed.    FINDINGS:  Uterus: Single intrauterine gestational sac. Nonspecific linear avascular   hypoechoic focus along the placental of unknown etiology.   FL: 1.3 cm corresponding to gestational age of 14 weeks and 2 days  BPD: 2.5 cm corresponding to gestational age of 13 weeks and 5 days  Estimated Gestational Age: 14 weeks and 0 day  Fetal Heart Rate: 1 55 bpm  Right ovary: 2.6 x 1.7 x 1.5 cm. Within normal limits. Normal arterial   and venous waveforms.  Left ovary: 3.7 x 2.2 x 3.7 cm containing cyst which measures up to 2.5   cm. Normal arterial and venous waveforms.  Fluid: None.    IMPRESSION:  Single viable intrauterine pregnancy.  Estimated average ultrasound gestational age of 14 weeks and 0 day.  Nonspecific linear avascular hyperechoic focus along the placental of   unknown etiology. Recommend clinical correlation and follow-up imaging if   indicated.      A/P 23 yo P0 at 13w2d admitted for IV antibiotics and observation in the setting of cholecystitis, stable  - management per general surgery, appreciated  - pt has improved clinically after initiation of IV zosyn and NPO, will attempt diet today  - pt counseled that another episode of cholecystitis may occur in pregnancy and that risks associated with cholecystitis often outweigh risks of surgery to fetus and thus, surgical intervention would be recommended  - review of pelvic ultrasound unremarkable MFM Fellow    Pt seen at bedside w/ MFM attending Dr. Newton and PGY3 Nadeem. Pt reports improvement in her abdominal discomfort and denies fever/chills, nausea or vomiting.  The patient reports having established prenatal care w/ Dr. Kayode Day at [place]. She does state that now that she has moved to [place] that she may desire transfer of care.    Vital Signs Last 24 Hrs  T(C): 36.7 (2019 09:15), Max: 37.1 (2019 21:07)  T(F): 98.1 (2019 09:15), Max: 98.7 (2019 21:07)  HR: 60 (2019 09:15) (55 - 61)  BP: 93/55 (2019 09:15) (79/51 - 96/60)  BP(mean): --  RR: 16 (2019 09:15) (16 - 18)  SpO2: 97% (2019 09:15) (97% - 100%)                          12.2   6.3   )-----------( 205      ( 2019 06:48 )             35.1   07-28    134<L>  |  100  |  4<L>  ----------------------------<  107<H>  3.5   |  21<L>  |  0.59    Ca    8.9      2019 06:48  Phos  3.7     07-28  Mg     1.8     -    TPro  6.6  /  Alb  3.8  /  TBili  0.9  /  DBili  x   /  AST  16  /  ALT  22  /  AlkPhos  70  07-28    Urinalysis Basic - ( 2019 22:03 )    Color: Yellow / Appearance: Clear / S.029 / pH: x  Gluc: x / Ketone: Moderate  / Bili: Negative / Urobili: 2 mg/dL   Blood: x / Protein: Trace / Nitrite: Negative   Leuk Esterase: Large / RBC: 7 /hpf / WBC 22 /HPF   Sq Epi: x / Non Sq Epi: 3 /hpf / Bacteria: Negative    < from: US OB >=14 Weeks, First Gestation (19 @ 23:08) >    EXAM:  US OB TRANSVAGINAL                          EXAM:  US OB GRT THAN 14 WKS 1ST GEST                          EXAM:  US DPLX PELVIC                            PROCEDURE DATE:  2019      INTERPRETATION:  CLINICAL INFORMATION: Abdominal pain.    LMP: 2019    Estimated Gestational Age by LMP: 14 weeks and 1 day    COMPARISON: None available.    Endovaginal and transabdominal pelvic sonogram. Color and Spectral   Doppler was performed.    FINDINGS:  Uterus: Single intrauterine gestational sac. Nonspecific linear avascular   hypoechoic focus along the placental of unknown etiology.   FL: 1.3 cm corresponding to gestational age of 14 weeks and 2 days  BPD: 2.5 cm corresponding to gestational age of 13 weeks and 5 days  Estimated Gestational Age: 14 weeks and 0 day  Fetal Heart Rate: 1 55 bpm  Right ovary: 2.6 x 1.7 x 1.5 cm. Within normal limits. Normal arterial   and venous waveforms.  Left ovary: 3.7 x 2.2 x 3.7 cm containing cyst which measures up to 2.5   cm. Normal arterial and venous waveforms.  Fluid: None.    IMPRESSION:  Single viable intrauterine pregnancy.  Estimated average ultrasound gestational age of 14 weeks and 0 day.  Nonspecific linear avascular hyperechoic focus along the placental of   unknown etiology. Recommend clinical correlation and follow-up imaging if   indicated.      A/P 23 yo P0 at 13w2d admitted for IV antibiotics and observation in the setting of cholecystitis, stable  - management per general surgery, appreciated  - pt has improved clinically after initiation of IV zosyn and NPO, will attempt diet today  - pt counseled that another episode of cholecystitis may occur in pregnancy and that risks associated with cholecystitis often outweigh risks of surgery in pregnancy, surgical intervention would be recommended  - review of pelvic ultrasound unremarkable MFM Fellow    Pt seen at bedside w/ MFM attending Dr. Newton and PGY3 Nadeem. Pt reports improvement in her abdominal discomfort and denies fever/chills, nausea or vomiting.  The patient reports having established prenatal care w/ Dr. Kayode Day at [place]. She does state that now that she has moved to [place] that she may desire transfer of care.    Vital Signs Last 24 Hrs  T(C): 36.7 (2019 09:15), Max: 37.1 (2019 21:07)  T(F): 98.1 (2019 09:15), Max: 98.7 (2019 21:07)  HR: 60 (2019 09:15) (55 - 61)  BP: 93/55 (2019 09:15) (79/51 - 96/60)  BP(mean): --  RR: 16 (2019 09:15) (16 - 18)  SpO2: 97% (2019 09:15) (97% - 100%)                          12.2   6.3   )-----------( 205      ( 2019 06:48 )             35.1   07-28    134<L>  |  100  |  4<L>  ----------------------------<  107<H>  3.5   |  21<L>  |  0.59    Ca    8.9      2019 06:48  Phos  3.7     07-28  Mg     1.8     -    TPro  6.6  /  Alb  3.8  /  TBili  0.9  /  DBili  x   /  AST  16  /  ALT  22  /  AlkPhos  70  07-28    Urinalysis Basic - ( 2019 22:03 )    Color: Yellow / Appearance: Clear / S.029 / pH: x  Gluc: x / Ketone: Moderate  / Bili: Negative / Urobili: 2 mg/dL   Blood: x / Protein: Trace / Nitrite: Negative   Leuk Esterase: Large / RBC: 7 /hpf / WBC 22 /HPF   Sq Epi: x / Non Sq Epi: 3 /hpf / Bacteria: Negative    < from: US OB >=14 Weeks, First Gestation (19 @ 23:08) >    EXAM:  US OB TRANSVAGINAL                          EXAM:  US OB GRT THAN 14 WKS 1ST GEST                          EXAM:  US DPLX PELVIC                            PROCEDURE DATE:  2019      INTERPRETATION:  CLINICAL INFORMATION: Abdominal pain.    LMP: 2019    Estimated Gestational Age by LMP: 14 weeks and 1 day    COMPARISON: None available.    Endovaginal and transabdominal pelvic sonogram. Color and Spectral   Doppler was performed.    FINDINGS:  Uterus: Single intrauterine gestational sac. Nonspecific linear avascular   hypoechoic focus along the placental of unknown etiology.   FL: 1.3 cm corresponding to gestational age of 14 weeks and 2 days  BPD: 2.5 cm corresponding to gestational age of 13 weeks and 5 days  Estimated Gestational Age: 14 weeks and 0 day  Fetal Heart Rate: 1 55 bpm  Right ovary: 2.6 x 1.7 x 1.5 cm. Within normal limits. Normal arterial   and venous waveforms.  Left ovary: 3.7 x 2.2 x 3.7 cm containing cyst which measures up to 2.5   cm. Normal arterial and venous waveforms.  Fluid: None.    IMPRESSION:  Single viable intrauterine pregnancy.  Estimated average ultrasound gestational age of 14 weeks and 0 day.  Nonspecific linear avascular hyperechoic focus along the placental of   unknown etiology. Recommend clinical correlation and follow-up imaging if   indicated.      A/P 25 yo P0 at 13w2d admitted for IV antibiotics and observation in the setting of cholecystitis, stable  - management per general surgery, appreciated  - pt has improved clinically after initiation of IV zosyn and NPO, will attempt diet today  - pt counseled that another episode of cholecystitis may occur in pregnancy and that risks associated with cholecystitis often outweigh risks of surgery in pregnancy, thus surgical intervention would be recommended  - review of pelvic ultrasound unremarkable

## 2019-07-29 ENCOUNTER — TRANSCRIPTION ENCOUNTER (OUTPATIENT)
Age: 24
End: 2019-07-29

## 2019-07-29 DIAGNOSIS — K81.9 CHOLECYSTITIS, UNSPECIFIED: ICD-10-CM

## 2019-07-29 DIAGNOSIS — Z3A.13 13 WEEKS GESTATION OF PREGNANCY: ICD-10-CM

## 2019-07-29 LAB
ALBUMIN SERPL ELPH-MCNC: 3.8 G/DL — SIGNIFICANT CHANGE UP (ref 3.3–5)
ALP SERPL-CCNC: 78 U/L — SIGNIFICANT CHANGE UP (ref 40–120)
ALT FLD-CCNC: 22 U/L — SIGNIFICANT CHANGE UP (ref 10–45)
ANION GAP SERPL CALC-SCNC: 15 MMOL/L — SIGNIFICANT CHANGE UP (ref 5–17)
AST SERPL-CCNC: 19 U/L — SIGNIFICANT CHANGE UP (ref 10–40)
BILIRUB SERPL-MCNC: 0.4 MG/DL — SIGNIFICANT CHANGE UP (ref 0.2–1.2)
BUN SERPL-MCNC: 6 MG/DL — LOW (ref 7–23)
CALCIUM SERPL-MCNC: 8.7 MG/DL — SIGNIFICANT CHANGE UP (ref 8.4–10.5)
CHLORIDE SERPL-SCNC: 100 MMOL/L — SIGNIFICANT CHANGE UP (ref 96–108)
CO2 SERPL-SCNC: 22 MMOL/L — SIGNIFICANT CHANGE UP (ref 22–31)
CREAT SERPL-MCNC: 0.6 MG/DL — SIGNIFICANT CHANGE UP (ref 0.5–1.3)
GLUCOSE SERPL-MCNC: 89 MG/DL — SIGNIFICANT CHANGE UP (ref 70–99)
HCT VFR BLD CALC: 35.9 % — SIGNIFICANT CHANGE UP (ref 34.5–45)
HGB BLD-MCNC: 12.4 G/DL — SIGNIFICANT CHANGE UP (ref 11.5–15.5)
MAGNESIUM SERPL-MCNC: 1.7 MG/DL — SIGNIFICANT CHANGE UP (ref 1.6–2.6)
MCHC RBC-ENTMCNC: 31 PG — SIGNIFICANT CHANGE UP (ref 27–34)
MCHC RBC-ENTMCNC: 34.6 GM/DL — SIGNIFICANT CHANGE UP (ref 32–36)
MCV RBC AUTO: 89.5 FL — SIGNIFICANT CHANGE UP (ref 80–100)
PHOSPHATE SERPL-MCNC: 4 MG/DL — SIGNIFICANT CHANGE UP (ref 2.5–4.5)
PLATELET # BLD AUTO: 181 K/UL — SIGNIFICANT CHANGE UP (ref 150–400)
POTASSIUM SERPL-MCNC: 3.3 MMOL/L — LOW (ref 3.5–5.3)
POTASSIUM SERPL-SCNC: 3.3 MMOL/L — LOW (ref 3.5–5.3)
PROT SERPL-MCNC: 6.6 G/DL — SIGNIFICANT CHANGE UP (ref 6–8.3)
RBC # BLD: 4.01 M/UL — SIGNIFICANT CHANGE UP (ref 3.8–5.2)
RBC # FLD: 11.8 % — SIGNIFICANT CHANGE UP (ref 10.3–14.5)
SODIUM SERPL-SCNC: 137 MMOL/L — SIGNIFICANT CHANGE UP (ref 135–145)
WBC # BLD: 5.8 K/UL — SIGNIFICANT CHANGE UP (ref 3.8–10.5)
WBC # FLD AUTO: 5.8 K/UL — SIGNIFICANT CHANGE UP (ref 3.8–10.5)

## 2019-07-29 RX ORDER — POTASSIUM CHLORIDE 20 MEQ
40 PACKET (EA) ORAL ONCE
Refills: 0 | Status: COMPLETED | OUTPATIENT
Start: 2019-07-29 | End: 2019-07-29

## 2019-07-29 RX ORDER — MAGNESIUM SULFATE 500 MG/ML
2 VIAL (ML) INJECTION ONCE
Refills: 0 | Status: COMPLETED | OUTPATIENT
Start: 2019-07-29 | End: 2019-07-29

## 2019-07-29 RX ADMIN — PIPERACILLIN AND TAZOBACTAM 25 GRAM(S): 4; .5 INJECTION, POWDER, LYOPHILIZED, FOR SOLUTION INTRAVENOUS at 02:01

## 2019-07-29 RX ADMIN — Medication 1 TABLET(S): at 17:34

## 2019-07-29 RX ADMIN — Medication 40 MILLIEQUIVALENT(S): at 09:03

## 2019-07-29 RX ADMIN — Medication 1 TABLET(S): at 12:24

## 2019-07-29 RX ADMIN — ENOXAPARIN SODIUM 40 MILLIGRAM(S): 100 INJECTION SUBCUTANEOUS at 12:24

## 2019-07-29 RX ADMIN — Medication 50 GRAM(S): at 09:03

## 2019-07-29 NOTE — PROGRESS NOTE ADULT - SUBJECTIVE AND OBJECTIVE BOX
MFM Fellow    Patient seen at bedside  No acute complaints. Tolerating diet. No pain. Currently transitioned to oral antibiotics. Denies any cramping, bleeding lof.   States she has established care in [place], has completed her ultrascreen- may decide transfer of care due to possibly moving to [place]     ICU Vital Signs Last 24 Hrs  T(C): 37.2 (29 Jul 2019 13:53), Max: 37.2 (29 Jul 2019 13:53)  T(F): 99 (29 Jul 2019 13:53), Max: 99 (29 Jul 2019 13:53)  HR: 63 (29 Jul 2019 13:53) (58 - 67)  BP: 92/54 (29 Jul 2019 13:53) (90/55 - 97/61)  RR: 17 (29 Jul 2019 13:53) (16 - 18)  SpO2: 97% (29 Jul 2019 13:53) (97% - 100%)      LABS:                        12.4   5.8   )-----------( 181      ( 29 Jul 2019 07:36 )             35.9     07-29    137  |  100  |  6<L>  ----------------------------<  89  3.3<L>   |  22  |  0.60    Ca    8.7      29 Jul 2019 07:37  Phos  4.0     07-29  Mg     1.7     07-29    TPro  6.6  /  Alb  3.8  /  TBili  0.4  /  DBili  x   /  AST  19  /  ALT  22  /  AlkPhos  78  07-29          RADIOLOGY & ADDITIONAL TESTS:

## 2019-07-29 NOTE — DISCHARGE NOTE PROVIDER - CARE PROVIDER_API CALL
Pino Tamayo)  Surgery; Surgical Critical Care  [address] [place] [address]  Phone: [phone]  Fax: [phone]  Follow Up Time:

## 2019-07-29 NOTE — PROGRESS NOTE ADULT - SUBJECTIVE AND OBJECTIVE BOX
Surgery Daily Progress Note     23 yo Female w/ pmh of cholelithiasis, currently 14 weeks pregnant, presenting acute cholecystitis    --------------------------------------------------------------------------------------------------------------------  SUBJECTIVE / 24H EVENTS  Patient seen and examined on morning rounds. No acute events overnight.  Pt tolerating low fat diet  Pt reports minimal pain    OBJECTIVE:    VITAL SIGNS:  T(C): 36.8 (07-29-19 @ 09:15), Max: 37 (07-29-19 @ 02:09)  HR: 67 (07-29-19 @ 09:15) (56 - 67)  BP: 95/57 (07-29-19 @ 09:15) (90/55 - 97/61)  RR: 17 (07-29-19 @ 09:15) (16 - 18)  SpO2: 99% (07-29-19 @ 09:15) (98% - 100%)  Daily     Daily       PHYSICAL EXAM:  Gen: NAD  LS: Respirations unlabored  GI: Soft. Nontender. Nondistended. BS+.  Ext: Warm, well perfused      07-28-19 @ 07:01  -  07-29-19 @ 07:00  --------------------------------------------------------  IN:    dextrose 5% + sodium chloride 0.45%.: 2400 mL    Oral Fluid: 480 mL  Total IN: 2880 mL    OUT:  Total OUT: 0 mL    Total NET: 2880 mL      07-29-19 @ 07:01  -  07-29-19 @ 12:51  --------------------------------------------------------  IN:    Oral Fluid: 360 mL  Total IN: 360 mL    OUT:  Total OUT: 0 mL    Total NET: 360 mL          LAB VALUES:  07-29    137  |  100  |  6<L>  ----------------------------<  89  3.3<L>   |  22  |  0.60    Ca    8.7      29 Jul 2019 07:37  Phos  4.0     07-29  Mg     1.7     07-29    TPro  6.6  /  Alb  3.8  /  TBili  0.4  /  DBili  x   /  AST  19  /  ALT  22  /  AlkPhos  78  07-29                               12.4   5.8   )-----------( 181      ( 29 Jul 2019 07:36 )             35.9     LIVER FUNCTIONS - ( 29 Jul 2019 07:37 )  Alb: 3.8 g/dL / Pro: 6.6 g/dL / ALK PHOS: 78 U/L / ALT: 22 U/L / AST: 19 U/L / GGT: x                       MICROBIOLOGY:    Culture - Urine (collected 27 Jul 2019 01:24)  Source: .Urine  Final Report (28 Jul 2019 08:16):    <10,000 CFU/mL Normal Urogenital Marycruz        RADIOLOGY:        MEDICATIONS  (STANDING):  amoxicillin  875 milliGRAM(s)/clavulanate 1 Tablet(s) Oral two times a day  enoxaparin Injectable 40 milliGRAM(s) SubCutaneous daily  prenatal multivitamin 1 Tablet(s) Oral daily    MEDICATIONS  (PRN):  acetaminophen   Tablet .. 650 milliGRAM(s) Oral every 6 hours PRN Mild Pain (1 - 3) Surgery Daily Progress Note     25 yo Female w/ pmh of cholelithiasis, currently 14 weeks pregnant, presenting acute cholecystitis    --------------------------------------------------------------------------------------------------------------------  SUBJECTIVE / 24H EVENTS  Patient seen and examined on morning rounds. No acute events overnight.  Pt tolerating low fat diet  Pt reports minimal pain    OBJECTIVE:    VITAL SIGNS:  T(C): 36.8 (07-29-19 @ 09:15), Max: 37 (07-29-19 @ 02:09)  HR: 67 (07-29-19 @ 09:15) (56 - 67)  BP: 95/57 (07-29-19 @ 09:15) (90/55 - 97/61)  RR: 17 (07-29-19 @ 09:15) (16 - 18)  SpO2: 99% (07-29-19 @ 09:15) (98% - 100%)  Daily     Daily       PHYSICAL EXAM:  Gen: NAD  LS: Respirations unlabored  GI: Soft. Mildly tender. Nondistended.   Ext: Warm, well perfused      07-28-19 @ 07:01  -  07-29-19 @ 07:00  --------------------------------------------------------  IN:    dextrose 5% + sodium chloride 0.45%.: 2400 mL    Oral Fluid: 480 mL  Total IN: 2880 mL    OUT:  Total OUT: 0 mL    Total NET: 2880 mL      07-29-19 @ 07:01  -  07-29-19 @ 12:51  --------------------------------------------------------  IN:    Oral Fluid: 360 mL  Total IN: 360 mL    OUT:  Total OUT: 0 mL    Total NET: 360 mL          LAB VALUES:  07-29    137  |  100  |  6<L>  ----------------------------<  89  3.3<L>   |  22  |  0.60    Ca    8.7      29 Jul 2019 07:37  Phos  4.0     07-29  Mg     1.7     07-29    TPro  6.6  /  Alb  3.8  /  TBili  0.4  /  DBili  x   /  AST  19  /  ALT  22  /  AlkPhos  78  07-29                               12.4   5.8   )-----------( 181      ( 29 Jul 2019 07:36 )             35.9     LIVER FUNCTIONS - ( 29 Jul 2019 07:37 )  Alb: 3.8 g/dL / Pro: 6.6 g/dL / ALK PHOS: 78 U/L / ALT: 22 U/L / AST: 19 U/L / GGT: x                       MICROBIOLOGY:    Culture - Urine (collected 27 Jul 2019 01:24)  Source: .Urine  Final Report (28 Jul 2019 08:16):    <10,000 CFU/mL Normal Urogenital Marycruz        RADIOLOGY:        MEDICATIONS  (STANDING):  amoxicillin  875 milliGRAM(s)/clavulanate 1 Tablet(s) Oral two times a day  enoxaparin Injectable 40 milliGRAM(s) SubCutaneous daily  prenatal multivitamin 1 Tablet(s) Oral daily    MEDICATIONS  (PRN):  acetaminophen   Tablet .. 650 milliGRAM(s) Oral every 6 hours PRN Mild Pain (1 - 3)

## 2019-07-29 NOTE — DISCHARGE NOTE PROVIDER - HOSPITAL COURSE
24F w/ pmh of cholelithiasis, currently 14 weeks pregnant presenting complaining of RUQ/epigastric pain radiating to bilateral flanks. States back pain began 4 days ago and abdominal pain began 2 days ago and has been persisting. Seen for same complaints at [place] 1 day ago and then transferred to [facility] in setting of UTI possibly requiring admission to OB/GYN service. Was seen in emergency department last night and discharged home with antibiotics for UTI, now presenting today because of ongoing pain. States pain is aggravated by spicy foods, has had similar episode 6 months ago and was diagnosed with cholecystitis, for which she was offered an operation but declined and opted for conservative management with antibiotics because she didn't have someone to take care of her child. Reports having nausea. Denies fevers, chills and changes in bowel habits.    US findings consistent with acute cholecystitis    - Admit to surgery: Dr. Moeller    - Abx: Zosyn    - MRCP to evaluate CBD dilation    - NPO/IVF    - OBGYN consulted/MFM 24F w/ pmh of cholelithiasis, currently 14 weeks pregnant presenting complaining of RUQ/epigastric pain radiating to bilateral flanks. States back pain began 4 days ago and abdominal pain began 2 days ago and has been persisting. Seen for same complaints at [place] 1 day ago and then transferred to [facility] in setting of UTI possibly requiring admission to OB/GYN service. Was seen in emergency department last night and discharged home with antibiotics for UTI, now presenting today because of ongoing pain. States pain is aggravated by spicy foods, has had similar episode 6 months ago and was diagnosed with cholecystitis, for which she was offered an operation but declined and opted for conservative management with antibiotics because she didn't have someone to take care of her child. Reports having nausea. Denies fevers, chills and changes in bowel habits.    US findings consistent with acute cholecystitis    - Admit to surgery: Dr. Moeller    - Abx: Zosyn    - MRCP to evaluate CBD dilation    - NPO/IVF    - OBGYN consulted/MFM        Patient currently refusing surgery at this time, mainly due to her current pregnancy. On 7/29, IV abx changed to po augmentin in prep for dc. At the time of discharge, the patient was hemodynamically stable, was tolerating PO diet, was voiding urine and passing stool, was ambulating, and was comfortable with adequate pain control. The patient was instructed to follow up with Dr. Tamayo and OBGYN within 1-2 weeks after discharge from the hospital. The patient/family felt comfortable with discharge. The patient was discharged to home with po abx. The patient had no other issues.

## 2019-07-29 NOTE — DISCHARGE NOTE PROVIDER - NSDCCPCAREPLAN_GEN_ALL_CORE_FT
PRINCIPAL DISCHARGE DIAGNOSIS  Diagnosis: Cholecystitis  Assessment and Plan of Treatment: Continue Augmentin orally 2x daily until course completed.  If you have any abdominal pain, nausea/vomtting, inability to tolerate diet, diarrhea or associated symptoms, please report back to ED.  Follow up with Dr. Tamayo within 1 week upon discharge.  Follow up with PMD regarding hospitalization within 1 week upon discharge.      SECONDARY DISCHARGE DIAGNOSES  Diagnosis: Pregnancy  Assessment and Plan of Treatment: Patient established prenatal care w/ Dr. Kayode Day at [place]. She does state that now that she has moved to [place] that she may desire transfer of care.  Continue follow up with OB/GYN as scheduled. PRINCIPAL DISCHARGE DIAGNOSIS  Diagnosis: Cholecystitis  Assessment and Plan of Treatment: Continue Augmentin orally 2x daily until course completed.  If you have any abdominal pain, nausea/vomtting, inability to tolerate diet, diarrhea or associated symptoms, please report back to ED.  Follow up with Dr. Tamayo within 1 week upon discharge.  Follow up with PMD regarding hospitalization within 1 week upon discharge.      SECONDARY DISCHARGE DIAGNOSES  Diagnosis: Pregnancy  Assessment and Plan of Treatment: Patient established prenatal care w/ Dr. Kayode Day at [place]. Will likely go back to OB care in [place], but can Call low risk clinic if she plans to transfer care ([phone]).  Follow up with OB/GYN as scheduled.

## 2019-07-29 NOTE — PROGRESS NOTE ADULT - ASSESSMENT
23 yo Female w/ pmh of cholelithiasis, currently 14 weeks pregnant, presenting acute cholecystitis    - Clinically improving  - Awaiting MRCP read  - Low fat diet  - Continue Abx, switch to PO in anticipation of discharge   - Serial abdominal exams

## 2019-07-29 NOTE — PROGRESS NOTE ADULT - PROBLEM SELECTOR PLAN 2
Patient completed ultrascreen, would need an early anatomy scan at 16 weeks or an anatomical survey scheduled for 20 weeks  Will likely go back to OB care in [place], but can Call low risk clinic if she plans to transfer care ([phone])    Please contact us for any further questions  Thomas HERRERA Fellow  Seen with Dr. Darrius Rizo

## 2019-07-29 NOTE — PROGRESS NOTE ADULT - PROBLEM SELECTOR PLAN 1
Appreciate surgery recommendations  Likely to continue conservative management with PO antibiotics. Patient has been counseled earlier than if episode returns, she may have to consider surgery  Low fat diet

## 2019-07-29 NOTE — PROGRESS NOTE ADULT - ASSESSMENT
A/P 25 yo P0 at 13w3d admitted for IV antibiotics and observation in the setting of cholecystitis, stable

## 2019-07-30 ENCOUNTER — TRANSCRIPTION ENCOUNTER (OUTPATIENT)
Age: 24
End: 2019-07-30

## 2019-07-30 VITALS
SYSTOLIC BLOOD PRESSURE: 96 MMHG | OXYGEN SATURATION: 98 % | TEMPERATURE: 98 F | HEART RATE: 57 BPM | RESPIRATION RATE: 16 BRPM | DIASTOLIC BLOOD PRESSURE: 63 MMHG

## 2019-07-30 LAB
ALBUMIN SERPL ELPH-MCNC: 3.4 G/DL — SIGNIFICANT CHANGE UP (ref 3.3–5)
ALP SERPL-CCNC: 75 U/L — SIGNIFICANT CHANGE UP (ref 40–120)
ALT FLD-CCNC: 31 U/L — SIGNIFICANT CHANGE UP (ref 10–45)
ANION GAP SERPL CALC-SCNC: 14 MMOL/L — SIGNIFICANT CHANGE UP (ref 5–17)
AST SERPL-CCNC: 30 U/L — SIGNIFICANT CHANGE UP (ref 10–40)
BILIRUB DIRECT SERPL-MCNC: <0.1 MG/DL — SIGNIFICANT CHANGE UP (ref 0–0.2)
BILIRUB INDIRECT FLD-MCNC: >0.2 MG/DL — SIGNIFICANT CHANGE UP (ref 0.2–1)
BILIRUB SERPL-MCNC: 0.3 MG/DL — SIGNIFICANT CHANGE UP (ref 0.2–1.2)
BUN SERPL-MCNC: 6 MG/DL — LOW (ref 7–23)
CALCIUM SERPL-MCNC: 9 MG/DL — SIGNIFICANT CHANGE UP (ref 8.4–10.5)
CHLORIDE SERPL-SCNC: 102 MMOL/L — SIGNIFICANT CHANGE UP (ref 96–108)
CO2 SERPL-SCNC: 22 MMOL/L — SIGNIFICANT CHANGE UP (ref 22–31)
CREAT SERPL-MCNC: 0.49 MG/DL — LOW (ref 0.5–1.3)
GLUCOSE SERPL-MCNC: 81 MG/DL — SIGNIFICANT CHANGE UP (ref 70–99)
HCT VFR BLD CALC: 33.3 % — LOW (ref 34.5–45)
HGB BLD-MCNC: 12.2 G/DL — SIGNIFICANT CHANGE UP (ref 11.5–15.5)
MAGNESIUM SERPL-MCNC: 1.7 MG/DL — SIGNIFICANT CHANGE UP (ref 1.6–2.6)
MCHC RBC-ENTMCNC: 32.1 PG — SIGNIFICANT CHANGE UP (ref 27–34)
MCHC RBC-ENTMCNC: 36.6 GM/DL — HIGH (ref 32–36)
MCV RBC AUTO: 87.6 FL — SIGNIFICANT CHANGE UP (ref 80–100)
PLATELET # BLD AUTO: 185 K/UL — SIGNIFICANT CHANGE UP (ref 150–400)
POTASSIUM SERPL-MCNC: 3.8 MMOL/L — SIGNIFICANT CHANGE UP (ref 3.5–5.3)
POTASSIUM SERPL-SCNC: 3.8 MMOL/L — SIGNIFICANT CHANGE UP (ref 3.5–5.3)
PROT SERPL-MCNC: 6.4 G/DL — SIGNIFICANT CHANGE UP (ref 6–8.3)
RBC # BLD: 3.8 M/UL — SIGNIFICANT CHANGE UP (ref 3.8–5.2)
RBC # FLD: 11.5 % — SIGNIFICANT CHANGE UP (ref 10.3–14.5)
SODIUM SERPL-SCNC: 138 MMOL/L — SIGNIFICANT CHANGE UP (ref 135–145)
WBC # BLD: 5.9 K/UL — SIGNIFICANT CHANGE UP (ref 3.8–10.5)
WBC # FLD AUTO: 5.9 K/UL — SIGNIFICANT CHANGE UP (ref 3.8–10.5)

## 2019-07-30 PROCEDURE — 74181 MRI ABDOMEN W/O CONTRAST: CPT

## 2019-07-30 PROCEDURE — 76700 US EXAM ABDOM COMPLETE: CPT

## 2019-07-30 PROCEDURE — 80076 HEPATIC FUNCTION PANEL: CPT

## 2019-07-30 PROCEDURE — 83690 ASSAY OF LIPASE: CPT

## 2019-07-30 PROCEDURE — 87086 URINE CULTURE/COLONY COUNT: CPT

## 2019-07-30 PROCEDURE — 81001 URINALYSIS AUTO W/SCOPE: CPT

## 2019-07-30 PROCEDURE — 93975 VASCULAR STUDY: CPT

## 2019-07-30 PROCEDURE — 84100 ASSAY OF PHOSPHORUS: CPT

## 2019-07-30 PROCEDURE — 80053 COMPREHEN METABOLIC PANEL: CPT

## 2019-07-30 PROCEDURE — 84702 CHORIONIC GONADOTROPIN TEST: CPT

## 2019-07-30 PROCEDURE — 83735 ASSAY OF MAGNESIUM: CPT

## 2019-07-30 PROCEDURE — 76817 TRANSVAGINAL US OBSTETRIC: CPT

## 2019-07-30 PROCEDURE — 76805 OB US >/= 14 WKS SNGL FETUS: CPT

## 2019-07-30 PROCEDURE — 85027 COMPLETE CBC AUTOMATED: CPT

## 2019-07-30 PROCEDURE — 99285 EMERGENCY DEPT VISIT HI MDM: CPT

## 2019-07-30 PROCEDURE — 80048 BASIC METABOLIC PNL TOTAL CA: CPT

## 2019-07-30 RX ADMIN — Medication 1 TABLET(S): at 12:55

## 2019-07-30 RX ADMIN — ENOXAPARIN SODIUM 40 MILLIGRAM(S): 100 INJECTION SUBCUTANEOUS at 12:55

## 2019-07-30 RX ADMIN — Medication 1 TABLET(S): at 05:31

## 2019-07-30 NOTE — PROGRESS NOTE ADULT - SUBJECTIVE AND OBJECTIVE BOX
Trauma Team Surgery Progress Note     Summary: 23 yo Female w/ pmh of cholelithiasis, currently 14 weeks pregnant, presenting acute cholecystitis    Subjective/24hour Events: No acute events overnight. Patient reports improved abdominal pain. Denies nausea and vomiting.    MEDICATIONS  (STANDING):  amoxicillin  875 milliGRAM(s)/clavulanate 1 Tablet(s) Oral two times a day  enoxaparin Injectable 40 milliGRAM(s) SubCutaneous daily  prenatal multivitamin 1 Tablet(s) Oral daily    MEDICATIONS  (PRN):  acetaminophen   Tablet .. 650 milliGRAM(s) Oral every 6 hours PRN Mild Pain (1 - 3)      Vital Signs:  Vital Signs Last 24 Hrs  T(C): 37.1 (30 Jul 2019 10:26), Max: 37.2 (29 Jul 2019 13:53)  T(F): 98.7 (30 Jul 2019 10:26), Max: 99 (29 Jul 2019 13:53)  HR: 74 (30 Jul 2019 10:26) (53 - 74)  BP: 101/68 (30 Jul 2019 10:26) (92/54 - 103/65)  BP(mean): --  RR: 16 (30 Jul 2019 10:26) (16 - 18)  SpO2: 99% (30 Jul 2019 10:26) (96% - 99%)        I&O's Detail    29 Jul 2019 07:01  -  30 Jul 2019 07:00  --------------------------------------------------------  IN:    Oral Fluid: 720 mL  Total IN: 720 mL    OUT:    Voided: 450 mL  Total OUT: 450 mL    Total NET: 270 mL      30 Jul 2019 07:01  -  30 Jul 2019 11:08  --------------------------------------------------------  IN:    Oral Fluid: 240 mL  Total IN: 240 mL    OUT:  Total OUT: 0 mL    Total NET: 240 mL          Physical Exam:  General: NAD, Pleasant female of stated age, comfortable in bed  Neurology: Patient is AA&Ox4  Neck: Neck supple, trachea midline, No JVD  Respiratory: CTA B/L, no increased wob  Abdomen: S/NT/ND  Extremities: 2+ peripheral pulses bilat throughout; (-)edema appreciated    LE: Inspection, palpation, strength, sensory, vascular    UE: Inspection, palpation, strength, sensory, vascular  Skin: No Rashes, Hematoma, Ecchymosis      Labs:    07-30    138  |  102  |  6<L>  ----------------------------<  81  3.8   |  22  |  0.49<L>    Ca    9.0      30 Jul 2019 06:29  Phos  4.0     07-29  Mg     1.7     07-30    TPro  6.4  /  Alb  3.4  /  TBili  0.3  /  DBili  <0.1  /  AST  30  /  ALT  31  /  AlkPhos  75  07-30    LIVER FUNCTIONS - ( 30 Jul 2019 06:29 )  Alb: 3.4 g/dL / Pro: 6.4 g/dL / ALK PHOS: 75 U/L / ALT: 31 U/L / AST: 30 U/L / GGT: x                                 12.2   5.9   )-----------( 185      ( 30 Jul 2019 06:29 )             33.3         Imaging: Trauma Team Surgery Progress Note     Summary: 23 yo Female w/ pmh of cholelithiasis, currently 14 weeks pregnant, presenting with acute cholecystitis. Treating medically, patient denying surgical intervention at this time.     Subjective/24hour Events: No acute events overnight. Patient reports improved abdominal pain. Denies nausea and vomiting.    MEDICATIONS  (STANDING):  amoxicillin  875 milliGRAM(s)/clavulanate 1 Tablet(s) Oral two times a day  enoxaparin Injectable 40 milliGRAM(s) SubCutaneous daily  prenatal multivitamin 1 Tablet(s) Oral daily    MEDICATIONS  (PRN):  acetaminophen   Tablet .. 650 milliGRAM(s) Oral every 6 hours PRN Mild Pain (1 - 3)      Vital Signs:  Vital Signs Last 24 Hrs  T(C): 37.1 (30 Jul 2019 10:26), Max: 37.2 (29 Jul 2019 13:53)  T(F): 98.7 (30 Jul 2019 10:26), Max: 99 (29 Jul 2019 13:53)  HR: 74 (30 Jul 2019 10:26) (53 - 74)  BP: 101/68 (30 Jul 2019 10:26) (92/54 - 103/65)  BP(mean): --  RR: 16 (30 Jul 2019 10:26) (16 - 18)  SpO2: 99% (30 Jul 2019 10:26) (96% - 99%)        I&O's Detail    29 Jul 2019 07:01  -  30 Jul 2019 07:00  --------------------------------------------------------  IN:    Oral Fluid: 720 mL  Total IN: 720 mL    OUT:    Voided: 450 mL  Total OUT: 450 mL    Total NET: 270 mL      30 Jul 2019 07:01  -  30 Jul 2019 11:08  --------------------------------------------------------  IN:    Oral Fluid: 240 mL  Total IN: 240 mL    OUT:  Total OUT: 0 mL    Total NET: 240 mL          Physical Exam:  General: NAD, Pleasant female of stated age, comfortable in bed  Neurology: Patient is AA&Ox4  Neck: Neck supple, trachea midline, No JVD  Respiratory: CTA B/L, no increased wob  Abdomen: S/NT/ND  Extremities: 2+ peripheral pulses bilat throughout; (-)edema appreciated  Skin: No Rashes, Hematoma, Ecchymosis      Labs:    07-30    138  |  102  |  6<L>  ----------------------------<  81  3.8   |  22  |  0.49<L>    Ca    9.0      30 Jul 2019 06:29  Phos  4.0     07-29  Mg     1.7     07-30    TPro  6.4  /  Alb  3.4  /  TBili  0.3  /  DBili  <0.1  /  AST  30  /  ALT  31  /  AlkPhos  75  07-30    LIVER FUNCTIONS - ( 30 Jul 2019 06:29 )  Alb: 3.4 g/dL / Pro: 6.4 g/dL / ALK PHOS: 75 U/L / ALT: 31 U/L / AST: 30 U/L / GGT: x                                 12.2   5.9   )-----------( 185      ( 30 Jul 2019 06:29 )             33.3

## 2019-07-30 NOTE — CHART NOTE - NSCHARTNOTEFT_GEN_A_CORE
FHR confirmed. FHR:154. Grossly normal appearing amniotic fluid. Gross fetal movement noted. Pt denies VB, LOF, or ctx  RShibata, pgy3

## 2019-07-30 NOTE — DISCHARGE NOTE NURSING/CASE MANAGEMENT/SOCIAL WORK - NSDCDPATPORTLINK_GEN_ALL_CORE
You can access the [facility], offered by [facility], by registering with the following website: [email or web address]

## 2019-07-30 NOTE — PROGRESS NOTE ADULT - ASSESSMENT
23 yo Female w/ pmh of cholelithiasis, currently 14 weeks pregnant, presenting acute cholecystitis    - Clinically improving  - Awaiting MRCP read  - Low fat diet  - Continue Abx, switch to PO in anticipation of discharge   - Serial abdominal exams    - attempt non-op treatment of acute cholecystitis given active pregnancy  - If worsens, will need cholecystectomy   - Will change to oral antibiotics and observe 23 yo Female w/ pmh of cholelithiasis, currently 14 weeks pregnant, presenting with acute cholecystitis, improving on medical management.     - Clinically improving  - MRCP  -- Acute cholecystitis. No choledocholithiasis.   - Low fat diet  - PO Augmentin x7 days  - Dispo: Home      ACS Surgery [phone]    Brando Correa M.D.   PGY1 - General Surgery   [facility]  Pager: [phone]  ACS Surgery [phone]

## 2019-07-30 NOTE — DISCHARGE NOTE NURSING/CASE MANAGEMENT/SOCIAL WORK - NSDCPNINST_GEN_ALL_CORE
If unable to tolerate diet, nausea, vomiting, fever above 100.3, chills, or an increase in pain , notify provider or return to ER.

## 2019-07-30 NOTE — PROGRESS NOTE ADULT - ATTENDING COMMENTS
minimal to no abdominal pain  will start low fat diet  patient prefers to defer surgery for now
Patient seen and examined  No new complaints  vitals stable  afebrile  very mild RUQ tenderness  LFTs, WBC=WNL    - attempt non-op treatment of acute cholecystitis given active pregnancy  - If worsens, will need cholecystectomy   - Will change to oral antibiotics and observe
patient deferred surgery at this time  no abdominal pain or tenderness this morning  advised to return to E.D. if symptoms re-occur and plan for cholecystectomy

## 2019-11-26 PROBLEM — Z00.00 ENCOUNTER FOR PREVENTIVE HEALTH EXAMINATION: Status: ACTIVE | Noted: 2019-11-26

## 2019-12-16 PROBLEM — Z78.9 OTHER SPECIFIED HEALTH STATUS: Chronic | Status: ACTIVE | Noted: 2019-07-26

## 2019-12-24 ENCOUNTER — NON-APPOINTMENT (OUTPATIENT)
Age: 24
End: 2019-12-24

## 2019-12-24 ENCOUNTER — LABORATORY RESULT (OUTPATIENT)
Age: 24
End: 2019-12-24

## 2019-12-24 ENCOUNTER — OUTPATIENT (OUTPATIENT)
Dept: OUTPATIENT SERVICES | Facility: HOSPITAL | Age: 24
LOS: 1 days | End: 2019-12-24
Payer: MEDICAID

## 2019-12-24 ENCOUNTER — APPOINTMENT (OUTPATIENT)
Dept: OBGYN | Facility: CLINIC | Age: 24
End: 2019-12-24
Payer: MEDICAID

## 2019-12-24 VITALS
WEIGHT: 150.25 LBS | DIASTOLIC BLOOD PRESSURE: 62 MMHG | HEIGHT: 63 IN | SYSTOLIC BLOOD PRESSURE: 100 MMHG | BODY MASS INDEX: 26.62 KG/M2

## 2019-12-24 DIAGNOSIS — Z86.19 PERSONAL HISTORY OF OTHER INFECTIOUS AND PARASITIC DISEASES: ICD-10-CM

## 2019-12-24 DIAGNOSIS — Z34.93 ENCOUNTER FOR SUPERVISION OF NORMAL PREGNANCY, UNSPECIFIED, THIRD TRIMESTER: ICD-10-CM

## 2019-12-24 DIAGNOSIS — Z34.80 ENCOUNTER FOR SUPERVISION OF OTHER NORMAL PREGNANCY, UNSPECIFIED TRIMESTER: ICD-10-CM

## 2019-12-24 DIAGNOSIS — Z98.890 OTHER SPECIFIED POSTPROCEDURAL STATES: Chronic | ICD-10-CM

## 2019-12-24 PROCEDURE — 83655 ASSAY OF LEAD: CPT

## 2019-12-24 PROCEDURE — 86780 TREPONEMA PALLIDUM: CPT

## 2019-12-24 PROCEDURE — G0463: CPT

## 2019-12-24 PROCEDURE — 99213 OFFICE O/P EST LOW 20 MIN: CPT | Mod: NC

## 2019-12-24 PROCEDURE — 84443 ASSAY THYROID STIM HORMONE: CPT

## 2019-12-24 PROCEDURE — 86480 TB TEST CELL IMMUN MEASURE: CPT

## 2019-12-24 PROCEDURE — 85027 COMPLETE CBC AUTOMATED: CPT

## 2019-12-24 PROCEDURE — 81003 URINALYSIS AUTO W/O SCOPE: CPT

## 2019-12-24 PROCEDURE — 86900 BLOOD TYPING SEROLOGIC ABO: CPT

## 2019-12-24 PROCEDURE — 87389 HIV-1 AG W/HIV-1&-2 AB AG IA: CPT

## 2019-12-24 RX ORDER — TERCONAZOLE 4 MG/G
0.4 CREAM VAGINAL
Qty: 1 | Refills: 0 | Status: ACTIVE | COMMUNITY
Start: 2019-12-24 | End: 1900-01-01

## 2019-12-25 LAB
HCT VFR BLD CALC: 36.6 % — SIGNIFICANT CHANGE UP (ref 34.5–45)
HGB BLD-MCNC: 11.9 G/DL — SIGNIFICANT CHANGE UP (ref 11.5–15.5)
HIV 1+2 AB+HIV1 P24 AG SERPL QL IA: SIGNIFICANT CHANGE UP
MCHC RBC-ENTMCNC: 30.3 PG — SIGNIFICANT CHANGE UP (ref 27–34)
MCHC RBC-ENTMCNC: 32.5 GM/DL — SIGNIFICANT CHANGE UP (ref 32–36)
MCV RBC AUTO: 93.1 FL — SIGNIFICANT CHANGE UP (ref 80–100)
PLATELET # BLD AUTO: 148 K/UL — LOW (ref 150–400)
RBC # BLD: 3.93 M/UL — SIGNIFICANT CHANGE UP (ref 3.8–5.2)
RBC # FLD: 12.8 % — SIGNIFICANT CHANGE UP (ref 10.3–14.5)
T PALLIDUM AB TITR SER: NEGATIVE — SIGNIFICANT CHANGE UP
T4 FREE+ TSH PNL SERPL: 1.87 UIU/ML — SIGNIFICANT CHANGE UP (ref 0.27–4.2)
WBC # BLD: 9.07 K/UL — SIGNIFICANT CHANGE UP (ref 3.8–10.5)
WBC # FLD AUTO: 9.07 K/UL — SIGNIFICANT CHANGE UP (ref 3.8–10.5)

## 2019-12-26 ENCOUNTER — ASOB RESULT (OUTPATIENT)
Age: 24
End: 2019-12-26

## 2019-12-26 ENCOUNTER — MED ADMIN CHARGE (OUTPATIENT)
Age: 24
End: 2019-12-26

## 2019-12-26 ENCOUNTER — APPOINTMENT (OUTPATIENT)
Dept: ANTEPARTUM | Facility: CLINIC | Age: 24
End: 2019-12-26
Payer: MEDICAID

## 2019-12-26 LAB — LEAD BLD-MCNC: <1 UG/DL — SIGNIFICANT CHANGE UP (ref 0–4)

## 2019-12-26 PROCEDURE — 76816 OB US FOLLOW-UP PER FETUS: CPT

## 2019-12-26 RX ADMIN — HYDROXYPROGESTERONE CAPROATE 0 MG/ML: 250 INJECTION INTRAMUSCULAR at 00:00

## 2019-12-27 LAB
GAMMA INTERFERON BACKGROUND BLD IA-ACNC: 0.03 IU/ML — SIGNIFICANT CHANGE UP
M TB IFN-G BLD-IMP: POSITIVE
M TB IFN-G CD4+ BCKGRND COR BLD-ACNC: 0.65 IU/ML — SIGNIFICANT CHANGE UP
M TB IFN-G CD4+CD8+ BCKGRND COR BLD-ACNC: 0.34 IU/ML — SIGNIFICANT CHANGE UP
QUANT TB PLUS MITOGEN MINUS NIL: >10 IU/ML — SIGNIFICANT CHANGE UP

## 2019-12-27 RX ORDER — HYDROXYPROGESTERONE CAPROATE 250 MG/ML
250 INJECTION INTRAMUSCULAR
Qty: 1 | Refills: 0 | Status: COMPLETED | OUTPATIENT
Start: 2019-12-26

## 2019-12-30 ENCOUNTER — INPATIENT (INPATIENT)
Facility: HOSPITAL | Age: 24
LOS: 2 days | Discharge: HOME | End: 2020-01-02
Attending: OBSTETRICS & GYNECOLOGY | Admitting: OBSTETRICS & GYNECOLOGY
Payer: MEDICAID

## 2019-12-30 VITALS — WEIGHT: 143.3 LBS | HEIGHT: 62 IN

## 2019-12-30 DIAGNOSIS — Z3A.00 WEEKS OF GESTATION OF PREGNANCY NOT SPECIFIED: ICD-10-CM

## 2019-12-30 DIAGNOSIS — O26.899 OTHER SPECIFIED PREGNANCY RELATED CONDITIONS, UNSPECIFIED TRIMESTER: ICD-10-CM

## 2019-12-30 DIAGNOSIS — Z98.890 OTHER SPECIFIED POSTPROCEDURAL STATES: Chronic | ICD-10-CM

## 2019-12-30 DIAGNOSIS — Z34.80 ENCOUNTER FOR SUPERVISION OF OTHER NORMAL PREGNANCY, UNSPECIFIED TRIMESTER: ICD-10-CM

## 2019-12-30 LAB
BASOPHILS # BLD AUTO: 0.03 K/UL — SIGNIFICANT CHANGE UP (ref 0–0.2)
BASOPHILS NFR BLD AUTO: 0.3 % — SIGNIFICANT CHANGE UP (ref 0–2)
BLD GP AB SCN SERPL QL: NEGATIVE — SIGNIFICANT CHANGE UP
EOSINOPHIL # BLD AUTO: 0.07 K/UL — SIGNIFICANT CHANGE UP (ref 0–0.5)
EOSINOPHIL NFR BLD AUTO: 0.6 % — SIGNIFICANT CHANGE UP (ref 0–6)
HCT VFR BLD CALC: 37.3 % — SIGNIFICANT CHANGE UP (ref 34.5–45)
HGB BLD-MCNC: 12.1 G/DL — SIGNIFICANT CHANGE UP (ref 11.5–15.5)
HIV 1 & 2 AB SERPL IA.RAPID: SIGNIFICANT CHANGE UP
IMM GRANULOCYTES NFR BLD AUTO: 1 % — SIGNIFICANT CHANGE UP (ref 0–1.5)
LYMPHOCYTES # BLD AUTO: 1.46 K/UL — SIGNIFICANT CHANGE UP (ref 1–3.3)
LYMPHOCYTES # BLD AUTO: 13.5 % — SIGNIFICANT CHANGE UP (ref 13–44)
MCHC RBC-ENTMCNC: 29.7 PG — SIGNIFICANT CHANGE UP (ref 27–34)
MCHC RBC-ENTMCNC: 32.4 GM/DL — SIGNIFICANT CHANGE UP (ref 32–36)
MCV RBC AUTO: 91.4 FL — SIGNIFICANT CHANGE UP (ref 80–100)
MONOCYTES # BLD AUTO: 0.65 K/UL — SIGNIFICANT CHANGE UP (ref 0–0.9)
MONOCYTES NFR BLD AUTO: 6 % — SIGNIFICANT CHANGE UP (ref 2–14)
NEUTROPHILS # BLD AUTO: 8.51 K/UL — HIGH (ref 1.8–7.4)
NEUTROPHILS NFR BLD AUTO: 78.6 % — HIGH (ref 43–77)
NRBC # BLD: 0 /100 WBCS — SIGNIFICANT CHANGE UP (ref 0–0)
PLATELET # BLD AUTO: 141 K/UL — LOW (ref 150–400)
RBC # BLD: 4.08 M/UL — SIGNIFICANT CHANGE UP (ref 3.8–5.2)
RBC # FLD: 12.6 % — SIGNIFICANT CHANGE UP (ref 10.3–14.5)
RH IG SCN BLD-IMP: POSITIVE — SIGNIFICANT CHANGE UP
RH IG SCN BLD-IMP: POSITIVE — SIGNIFICANT CHANGE UP
WBC # BLD: 10.83 K/UL — HIGH (ref 3.8–10.5)
WBC # FLD AUTO: 10.83 K/UL — HIGH (ref 3.8–10.5)

## 2019-12-30 RX ORDER — SODIUM CHLORIDE 9 MG/ML
1000 INJECTION, SOLUTION INTRAVENOUS
Refills: 0 | Status: DISCONTINUED | OUTPATIENT
Start: 2019-12-30 | End: 2019-12-31

## 2019-12-30 RX ORDER — OXYTOCIN 10 UNIT/ML
333.33 VIAL (ML) INJECTION
Qty: 20 | Refills: 0 | Status: DISCONTINUED | OUTPATIENT
Start: 2019-12-30 | End: 2020-01-02

## 2019-12-30 RX ORDER — AMPICILLIN TRIHYDRATE 250 MG
2 CAPSULE ORAL ONCE
Refills: 0 | Status: COMPLETED | OUTPATIENT
Start: 2019-12-30 | End: 2019-12-30

## 2019-12-30 RX ORDER — AMPICILLIN TRIHYDRATE 250 MG
1 CAPSULE ORAL EVERY 4 HOURS
Refills: 0 | Status: DISCONTINUED | OUTPATIENT
Start: 2019-12-30 | End: 2019-12-31

## 2019-12-30 RX ORDER — CITRIC ACID/SODIUM CITRATE 300-500 MG
15 SOLUTION, ORAL ORAL EVERY 6 HOURS
Refills: 0 | Status: DISCONTINUED | OUTPATIENT
Start: 2019-12-30 | End: 2019-12-31

## 2019-12-30 RX ORDER — OXYTOCIN 10 UNIT/ML
4 VIAL (ML) INJECTION
Qty: 30 | Refills: 0 | Status: DISCONTINUED | OUTPATIENT
Start: 2019-12-30 | End: 2020-01-02

## 2019-12-30 RX ADMIN — SODIUM CHLORIDE 125 MILLILITER(S): 9 INJECTION, SOLUTION INTRAVENOUS at 20:27

## 2019-12-30 RX ADMIN — Medication 12 MILLIGRAM(S): at 20:27

## 2019-12-30 RX ADMIN — Medication 216 GRAM(S): at 20:27

## 2019-12-30 RX ADMIN — SODIUM CHLORIDE 125 MILLILITER(S): 9 INJECTION, SOLUTION INTRAVENOUS at 19:48

## 2019-12-30 RX ADMIN — Medication 4 MILLIUNIT(S)/MIN: at 21:55

## 2019-12-31 LAB
HBV SURFACE AG SERPL QL IA: SIGNIFICANT CHANGE UP
RUBV IGG SER-ACNC: 7.8 INDEX — SIGNIFICANT CHANGE UP
RUBV IGG SER-IMP: POSITIVE — SIGNIFICANT CHANGE UP
T PALLIDUM AB TITR SER: NEGATIVE — SIGNIFICANT CHANGE UP

## 2019-12-31 PROCEDURE — 59409 OBSTETRICAL CARE: CPT | Mod: U9

## 2019-12-31 PROCEDURE — 71045 X-RAY EXAM CHEST 1 VIEW: CPT | Mod: 26

## 2019-12-31 RX ORDER — HYDROCORTISONE 1 %
1 OINTMENT (GRAM) TOPICAL EVERY 6 HOURS
Refills: 0 | Status: DISCONTINUED | OUTPATIENT
Start: 2019-12-31 | End: 2020-01-02

## 2019-12-31 RX ORDER — OXYCODONE HYDROCHLORIDE 5 MG/1
5 TABLET ORAL ONCE
Refills: 0 | Status: DISCONTINUED | OUTPATIENT
Start: 2019-12-31 | End: 2020-01-02

## 2019-12-31 RX ORDER — ACETAMINOPHEN 500 MG
1000 TABLET ORAL ONCE
Refills: 0 | Status: COMPLETED | OUTPATIENT
Start: 2019-12-31 | End: 2019-12-31

## 2019-12-31 RX ORDER — OXYTOCIN 10 UNIT/ML
333.33 VIAL (ML) INJECTION
Qty: 20 | Refills: 0 | Status: DISCONTINUED | OUTPATIENT
Start: 2019-12-31 | End: 2020-01-02

## 2019-12-31 RX ORDER — LANOLIN
1 OINTMENT (GRAM) TOPICAL EVERY 6 HOURS
Refills: 0 | Status: DISCONTINUED | OUTPATIENT
Start: 2019-12-31 | End: 2020-01-02

## 2019-12-31 RX ORDER — IBUPROFEN 200 MG
600 TABLET ORAL EVERY 6 HOURS
Refills: 0 | Status: COMPLETED | OUTPATIENT
Start: 2019-12-31 | End: 2020-11-28

## 2019-12-31 RX ORDER — TETANUS TOXOID, REDUCED DIPHTHERIA TOXOID AND ACELLULAR PERTUSSIS VACCINE, ADSORBED 5; 2.5; 8; 8; 2.5 [IU]/.5ML; [IU]/.5ML; UG/.5ML; UG/.5ML; UG/.5ML
0.5 SUSPENSION INTRAMUSCULAR ONCE
Refills: 0 | Status: DISCONTINUED | OUTPATIENT
Start: 2019-12-31 | End: 2020-01-02

## 2019-12-31 RX ORDER — MAGNESIUM HYDROXIDE 400 MG/1
30 TABLET, CHEWABLE ORAL
Refills: 0 | Status: DISCONTINUED | OUTPATIENT
Start: 2019-12-31 | End: 2020-01-02

## 2019-12-31 RX ORDER — BENZOCAINE 10 %
1 GEL (GRAM) MUCOUS MEMBRANE EVERY 6 HOURS
Refills: 0 | Status: DISCONTINUED | OUTPATIENT
Start: 2019-12-31 | End: 2020-01-02

## 2019-12-31 RX ORDER — OXYCODONE HYDROCHLORIDE 5 MG/1
5 TABLET ORAL
Refills: 0 | Status: DISCONTINUED | OUTPATIENT
Start: 2019-12-31 | End: 2020-01-02

## 2019-12-31 RX ORDER — ACETAMINOPHEN 500 MG
975 TABLET ORAL
Refills: 0 | Status: DISCONTINUED | OUTPATIENT
Start: 2019-12-31 | End: 2020-01-02

## 2019-12-31 RX ORDER — DIBUCAINE 1 %
1 OINTMENT (GRAM) RECTAL EVERY 6 HOURS
Refills: 0 | Status: DISCONTINUED | OUTPATIENT
Start: 2019-12-31 | End: 2020-01-02

## 2019-12-31 RX ORDER — SIMETHICONE 80 MG/1
80 TABLET, CHEWABLE ORAL EVERY 4 HOURS
Refills: 0 | Status: DISCONTINUED | OUTPATIENT
Start: 2019-12-31 | End: 2020-01-02

## 2019-12-31 RX ORDER — PRAMOXINE HYDROCHLORIDE 150 MG/15G
1 AEROSOL, FOAM RECTAL EVERY 4 HOURS
Refills: 0 | Status: DISCONTINUED | OUTPATIENT
Start: 2019-12-31 | End: 2020-01-02

## 2019-12-31 RX ORDER — AER TRAVELER 0.5 G/1
1 SOLUTION RECTAL; TOPICAL EVERY 4 HOURS
Refills: 0 | Status: DISCONTINUED | OUTPATIENT
Start: 2019-12-31 | End: 2020-01-02

## 2019-12-31 RX ORDER — KETOROLAC TROMETHAMINE 30 MG/ML
30 SYRINGE (ML) INJECTION ONCE
Refills: 0 | Status: DISCONTINUED | OUTPATIENT
Start: 2019-12-31 | End: 2019-12-31

## 2019-12-31 RX ORDER — GLYCERIN ADULT
1 SUPPOSITORY, RECTAL RECTAL AT BEDTIME
Refills: 0 | Status: DISCONTINUED | OUTPATIENT
Start: 2019-12-31 | End: 2020-01-02

## 2019-12-31 RX ORDER — SODIUM CHLORIDE 9 MG/ML
3 INJECTION INTRAMUSCULAR; INTRAVENOUS; SUBCUTANEOUS EVERY 8 HOURS
Refills: 0 | Status: DISCONTINUED | OUTPATIENT
Start: 2019-12-31 | End: 2020-01-02

## 2019-12-31 RX ORDER — DIPHENHYDRAMINE HCL 50 MG
25 CAPSULE ORAL EVERY 6 HOURS
Refills: 0 | Status: DISCONTINUED | OUTPATIENT
Start: 2019-12-31 | End: 2020-01-02

## 2019-12-31 RX ORDER — IBUPROFEN 200 MG
600 TABLET ORAL EVERY 6 HOURS
Refills: 0 | Status: DISCONTINUED | OUTPATIENT
Start: 2019-12-31 | End: 2020-01-02

## 2019-12-31 RX ADMIN — Medication 30 MILLIGRAM(S): at 07:29

## 2019-12-31 RX ADMIN — Medication 108 GRAM(S): at 00:31

## 2019-12-31 RX ADMIN — Medication 600 MILLIGRAM(S): at 17:51

## 2019-12-31 RX ADMIN — Medication 400 MILLIGRAM(S): at 07:36

## 2019-12-31 RX ADMIN — Medication 600 MILLIGRAM(S): at 18:20

## 2019-12-31 RX ADMIN — Medication 600 MILLIGRAM(S): at 12:09

## 2019-12-31 RX ADMIN — Medication 600 MILLIGRAM(S): at 12:40

## 2019-12-31 RX ADMIN — Medication 30 MILLIGRAM(S): at 04:50

## 2019-12-31 RX ADMIN — Medication 975 MILLIGRAM(S): at 21:07

## 2019-12-31 RX ADMIN — Medication 1 TABLET(S): at 12:09

## 2019-12-31 RX ADMIN — Medication 975 MILLIGRAM(S): at 21:37

## 2020-01-01 ENCOUNTER — TRANSCRIPTION ENCOUNTER (OUTPATIENT)
Age: 25
End: 2020-01-01

## 2020-01-01 LAB
HCT VFR BLD CALC: 30.7 % — LOW (ref 34.5–45)
HCV RNA SERPL NAA DL=5-ACNC: SIGNIFICANT CHANGE UP
HCV RNA SPEC NAA+PROBE-LOG IU: SIGNIFICANT CHANGE UP LOG10IU/ML
HGB BLD-MCNC: 10.3 G/DL — LOW (ref 11.5–15.5)

## 2020-01-01 RX ORDER — NORETHINDRONE 0.35 MG/1
1 TABLET ORAL
Qty: 28 | Refills: 6
Start: 2020-01-01 | End: 2020-07-14

## 2020-01-01 RX ORDER — IBUPROFEN 200 MG
1 TABLET ORAL
Qty: 0 | Refills: 0 | DISCHARGE
Start: 2020-01-01

## 2020-01-01 RX ORDER — ACETAMINOPHEN 500 MG
3 TABLET ORAL
Qty: 0 | Refills: 0 | DISCHARGE
Start: 2020-01-01

## 2020-01-01 RX ADMIN — Medication 600 MILLIGRAM(S): at 06:05

## 2020-01-01 RX ADMIN — Medication 975 MILLIGRAM(S): at 04:13

## 2020-01-01 RX ADMIN — Medication 600 MILLIGRAM(S): at 00:50

## 2020-01-01 RX ADMIN — Medication 1 TABLET(S): at 12:31

## 2020-01-01 RX ADMIN — Medication 600 MILLIGRAM(S): at 00:20

## 2020-01-01 RX ADMIN — Medication 600 MILLIGRAM(S): at 13:00

## 2020-01-01 RX ADMIN — Medication 600 MILLIGRAM(S): at 18:32

## 2020-01-01 RX ADMIN — Medication 975 MILLIGRAM(S): at 03:43

## 2020-01-01 RX ADMIN — Medication 600 MILLIGRAM(S): at 18:00

## 2020-01-01 RX ADMIN — Medication 600 MILLIGRAM(S): at 12:31

## 2020-01-01 RX ADMIN — Medication 600 MILLIGRAM(S): at 06:35

## 2020-01-01 RX ADMIN — Medication 975 MILLIGRAM(S): at 09:51

## 2020-01-01 RX ADMIN — Medication 975 MILLIGRAM(S): at 10:30

## 2020-01-01 RX ADMIN — Medication 975 MILLIGRAM(S): at 21:03

## 2020-01-01 RX ADMIN — Medication 975 MILLIGRAM(S): at 21:33

## 2020-01-01 NOTE — PROGRESS NOTE ADULT - PROBLEM SELECTOR PLAN 1
- Pain well controlled, continue current pain regimen  - Increase ambulation, SCDs when not ambulating  - Continue regular diet  - AM H/H  -POP for pp contraception    Robyn Frankel PGY-1

## 2020-01-01 NOTE — DISCHARGE NOTE OB - CARE PROVIDER_API CALL
[facility],   [address] [place] [address]  [address]  Phone: [phone]  Fax: (   )    -  Follow Up Time:

## 2020-01-01 NOTE — DISCHARGE NOTE OB - PATIENT PORTAL LINK FT
You can access the FollowMyHealth Patient Portal offered by [facility] by registering at the following website: [email or web address]. By joining [facility]’s FollowMyHealth portal, you will also be able to view your health information using other applications (apps) compatible with our system.

## 2020-01-01 NOTE — DISCHARGE NOTE OB - CARE PLAN
Principal Discharge DX:	 (normal spontaneous vaginal delivery)  Goal:	recovery  Assessment and plan of treatment:	Make your follow-up appointment with your doctor in 6 weeks for a post-partum check. No heavy lifting, driving, or strenuous activity for 6 weeks. Nothing per vagina such as tampons, intercourse, douches, or tub baths for 6 weeks or until you see your doctor. Call your doctor with any signs and symptoms of infection such as fever, chills, nausea, or vomiting. Call your doctor if you're unable to tolerate food, increase in vaginal bleeding, or have difficulty urinating. Call your doctor if you have pain that is not relieved by your prescribed medications. Notify your doctor with any other concerns.   Call [phone] if you have any of these concerns in the next 6 weeks.

## 2020-01-01 NOTE — DISCHARGE NOTE OB - MEDICATION SUMMARY - MEDICATIONS TO TAKE
I will START or STAY ON the medications listed below when I get home from the hospital:    acetaminophen 325 mg oral tablet  -- 3 tab(s) by mouth   -- Indication: For pain    ibuprofen 600 mg oral tablet  -- 1 tab(s) by mouth every 6 hours  -- Indication: For pain    Prenatal Multivitamins with Folic Acid 1 mg oral tablet  -- 1 tab(s) by mouth once a day  -- Indication: For breast feeding

## 2020-01-01 NOTE — DISCHARGE NOTE OB - HOSPITAL COURSE
Patient had an uncomplicated  followed by an uncomplicated postpartum course. , Hct 30.7. On postpartum day 2, patient was discharged home in stable condition, voiding spontaneously and with normal vital signs. Pt elected for progestin only pills for birth control, which was sent to the pharmacy for pickup prior to discharge.

## 2020-01-01 NOTE — PROGRESS NOTE ADULT - SUBJECTIVE AND OBJECTIVE BOX
OB Progress Note:  PPD#1    S: 25yo PPD#1 s/p . Patient feels well. Pain is well controlled. She is tolerating a regular diet and passing flatus. She is voiding spontaneously, and ambulating without difficulty. Denies CP/SOB. Denies lightheadedness/dizziness. Denies N/V. Denies heavy vaginal bleeding. After pp contraception counseling, patient elects for POPs for PP birth control.    O:  Vitals:  Vital Signs Last 24 Hrs  T(C): 36.8 (2020 06:06), Max: 36.9 (31 Dec 2019 21:37)  T(F): 98.2 (2020 06:06), Max: 98.4 (31 Dec 2019 21:37)  HR: 65 (2020 06:06) (52 - 65)  BP: 108/64 (2020 06:06) (94/61 - 115/70)  BP(mean): --  RR: 18 (2020 06:06) (18 - 18)  SpO2: 98% (2020 06:06) (96% - 98%)    MEDICATIONS  (STANDING):  acetaminophen   Tablet .. 975 milliGRAM(s) Oral <User Schedule>  diphtheria/tetanus/pertussis (acellular) Vaccine (ADAcel) 0.5 milliLiter(s) IntraMuscular once  ibuprofen  Tablet. 600 milliGRAM(s) Oral every 6 hours  oxytocin Infusion 333.333 milliUNIT(s)/Min (1000 mL/Hr) IV Continuous <Continuous>  oxytocin Infusion 333.333 milliUNIT(s)/Min (1000 mL/Hr) IV Continuous <Continuous>  oxytocin Infusion 4 milliUNIT(s)/Min (4 mL/Hr) IV Continuous <Continuous>  prenatal multivitamin 1 Tablet(s) Oral daily  sodium chloride 0.9% lock flush 3 milliLiter(s) IV Push every 8 hours      Labs:  Blood type: O Positive  Rubella IgG: Positive ( @ 01:02)  RPR: Negative                          12.1   10.83<H> >-----------< 141<L>    (  @ 20:49 )             37.3                  Physical Exam:  General: NAD  Abdomen: soft, non-tender, non-distended, fundus firm  Vaginal: No heavy vaginal bleeding  Extremities: No erythema/edema

## 2020-01-01 NOTE — DISCHARGE NOTE OB - DRINK 8 TO 10 GLASSES OF FLUID EACH DAY
Problem: Patient Care Overview  Goal: Plan of Care/Patient Progress Review  Outcome: Improving  Pt states he would like to go home. MD spoke with pt about the need to continue with his treatment. Pt frustrated by circumstances. VSS. Pt steady on his feet and calls for help. VSS. Pt ate well for breakfast.        Statement Selected

## 2020-01-01 NOTE — DISCHARGE NOTE OB - PROVIDER TOKENS
FREE:[LAST:[[facility]],PHONE:[phone]],FAX:[(   [phone]    -],ADDRESS:[[address] [place] [address]]]

## 2020-01-01 NOTE — DISCHARGE NOTE OB - MATERIALS PROVIDED
New Beginnings/Tdap Vaccination (VIS Pub Date: 2012)/[facility]  Screening Program/Back To Sleep Handout/[facility] Hearing Screen Program/Breastfeeding Mother’s Support Group Information/Birth Certificate Instructions/Shaken Baby Prevention Handout

## 2020-01-01 NOTE — DISCHARGE NOTE OB - PLAN OF CARE
recovery Make your follow-up appointment with your doctor in 6 weeks for a post-partum check. No heavy lifting, driving, or strenuous activity for 6 weeks. Nothing per vagina such as tampons, intercourse, douches, or tub baths for 6 weeks or until you see your doctor. Call your doctor with any signs and symptoms of infection such as fever, chills, nausea, or vomiting. Call your doctor if you're unable to tolerate food, increase in vaginal bleeding, or have difficulty urinating. Call your doctor if you have pain that is not relieved by your prescribed medications. Notify your doctor with any other concerns.   Call [phone] if you have any of these concerns in the next 6 weeks.

## 2020-01-02 ENCOUNTER — APPOINTMENT (OUTPATIENT)
Dept: OBGYN | Facility: CLINIC | Age: 25
End: 2020-01-02

## 2020-01-02 VITALS
TEMPERATURE: 99 F | HEART RATE: 64 BPM | DIASTOLIC BLOOD PRESSURE: 81 MMHG | OXYGEN SATURATION: 98 % | SYSTOLIC BLOOD PRESSURE: 128 MMHG | RESPIRATION RATE: 18 BRPM

## 2020-01-02 PROCEDURE — 86900 BLOOD TYPING SEROLOGIC ABO: CPT

## 2020-01-02 PROCEDURE — 85018 HEMOGLOBIN: CPT

## 2020-01-02 PROCEDURE — 87522 HEPATITIS C REVRS TRNSCRPJ: CPT

## 2020-01-02 PROCEDURE — 86780 TREPONEMA PALLIDUM: CPT

## 2020-01-02 PROCEDURE — 85027 COMPLETE CBC AUTOMATED: CPT

## 2020-01-02 PROCEDURE — G0463: CPT

## 2020-01-02 PROCEDURE — 86850 RBC ANTIBODY SCREEN: CPT

## 2020-01-02 PROCEDURE — 59025 FETAL NON-STRESS TEST: CPT

## 2020-01-02 PROCEDURE — 85014 HEMATOCRIT: CPT

## 2020-01-02 PROCEDURE — 87340 HEPATITIS B SURFACE AG IA: CPT

## 2020-01-02 PROCEDURE — 86901 BLOOD TYPING SEROLOGIC RH(D): CPT

## 2020-01-02 PROCEDURE — 59050 FETAL MONITOR W/REPORT: CPT

## 2020-01-02 PROCEDURE — 86703 HIV-1/HIV-2 1 RESULT ANTBDY: CPT

## 2020-01-02 PROCEDURE — 71045 X-RAY EXAM CHEST 1 VIEW: CPT

## 2020-01-02 PROCEDURE — 86762 RUBELLA ANTIBODY: CPT

## 2020-01-02 RX ORDER — NORETHINDRONE AND ETHINYL ESTRADIOL 0.4-0.035
1 KIT ORAL
Qty: 30 | Refills: 11
Start: 2020-01-02 | End: 2020-12-26

## 2020-01-02 RX ADMIN — Medication 600 MILLIGRAM(S): at 00:51

## 2020-01-02 RX ADMIN — Medication 600 MILLIGRAM(S): at 00:21

## 2020-01-02 RX ADMIN — Medication 600 MILLIGRAM(S): at 06:21

## 2020-01-02 RX ADMIN — Medication 600 MILLIGRAM(S): at 06:51

## 2020-01-02 NOTE — PROGRESS NOTE ADULT - SUBJECTIVE AND OBJECTIVE BOX
OB Progress Note:  PPD#2    S: 23yo PPD#2 s/p . Patient feels well. Pain is well controlled, tolerating regular diet, passing flatus, voiding spontaneously, ambulating without difficulty. Denies heavy vaginal bleeding, CP/SOB, leadheadedness/dizziness, N/V.    O:  Vitals:   Vital Signs Last 24 Hrs  T(C): 37 (2020 06:33), Max: 37.1 (2020 17:01)  T(F): 98.6 (2020 06:33), Max: 98.8 (2020 17:01)  HR: 64 (2020 06:33) (64 - 68)  BP: 128/81 (2020 06:33) (101/63 - 128/81)  BP(mean): --  RR: 18 (2020 06:33) (18 - 18)  SpO2: 98% (2020 06:33) (98% - 98%)    MEDICATIONS  (STANDING):  acetaminophen   Tablet .. 975 milliGRAM(s) Oral <User Schedule>  diphtheria/tetanus/pertussis (acellular) Vaccine (ADAcel) 0.5 milliLiter(s) IntraMuscular once  ibuprofen  Tablet. 600 milliGRAM(s) Oral every 6 hours  measles/mumps/rubella Vaccine 0.5 milliLiter(s) SubCutaneous once  oxytocin Infusion 333.333 milliUNIT(s)/Min (1000 mL/Hr) IV Continuous <Continuous>  oxytocin Infusion 333.333 milliUNIT(s)/Min (1000 mL/Hr) IV Continuous <Continuous>  oxytocin Infusion 4 milliUNIT(s)/Min (4 mL/Hr) IV Continuous <Continuous>  prenatal multivitamin 1 Tablet(s) Oral daily  sodium chloride 0.9% lock flush 3 milliLiter(s) IV Push every 8 hours    MEDICATIONS  (PRN):  benzocaine 20%/menthol 0.5% Spray 1 Spray(s) Topical every 6 hours PRN for Perineal discomfort  dibucaine 1% Ointment 1 Application(s) Topical every 6 hours PRN Perineal discomfort  diphenhydrAMINE 25 milliGRAM(s) Oral every 6 hours PRN Pruritus  glycerin Suppository - Adult 1 Suppository(s) Rectal at bedtime PRN Constipation  hydrocortisone 1% Cream 1 Application(s) Topical every 6 hours PRN Moderate Pain (4-6)  lanolin Ointment 1 Application(s) Topical every 6 hours PRN nipple soreness  magnesium hydroxide Suspension 30 milliLiter(s) Oral two times a day PRN Constipation  oxyCODONE    IR 5 milliGRAM(s) Oral every 3 hours PRN Moderate to Severe Pain (4-10)  oxyCODONE    IR 5 milliGRAM(s) Oral once PRN Moderate to Severe Pain (4-10)  pramoxine 1%/zinc 5% Cream 1 Application(s) Topical every 4 hours PRN Moderate Pain (4-6)  simethicone 80 milliGRAM(s) Chew every 4 hours PRN Gas  witch hazel Pads 1 Application(s) Topical every 4 hours PRN Perineal discomfort      Labs:  Blood type: O Positive  Rubella IgG: Positive ( @ 01:02)  RPR: Negative                          10.3<L>   -- >-----------< --    (  @ 09:54 )             30.7<L>                        12.1   10.83<H> >-----------< 141<L>    (  @ 20:49 )             37.3                  Physical Exam:  General: NAD  Abdomen: soft, non-tender, non-distended, fundus firm  Vaginal: No heavy vaginal bleeding  Extremities: No erythema/edema

## 2020-01-02 NOTE — PROGRESS NOTE ADULT - PROBLEM SELECTOR PLAN 1
- Pain well controlled, continue current pain regimen  - Increase ambulation, SCDs when not ambulating  - Continue regular diet  - Quant gold +, s/p CXR wnl (12/31)  - Discharge planning     Carly Duggan, PGY-1

## 2020-01-08 DIAGNOSIS — B37.3 CANDIDIASIS OF VULVA AND VAGINA: ICD-10-CM

## 2020-01-08 DIAGNOSIS — Z34.93 ENCOUNTER FOR SUPERVISION OF NORMAL PREGNANCY, UNSPECIFIED, THIRD TRIMESTER: ICD-10-CM

## 2020-12-21 PROBLEM — Z86.19 HISTORY OF CANDIDAL VULVOVAGINITIS: Status: RESOLVED | Noted: 2019-12-24 | Resolved: 2020-12-21

## 2023-02-22 NOTE — CONSULT NOTE ADULT - SUBJECTIVE AND OBJECTIVE BOX
[facility] General Surgery Consultation     Patient is a 24y old Female who presents with a chief complaint of abdominal pain    HPI:    24F w/ pmh of cholelithiasis, currently 14 weeks pregnant presenting complaining of RUQ/epigastric pain radiating to bilateral flanks. States back pain began 4 days ago and abdominal pain began 2 days ago and has been persisting. Seen for same complaints at [place] 1 day ago and then transferred to [facility] in setting of UTI possibly requiring admission to OB/GYN service. Was seen in emergency department last night and discharged home with antibiotics for UTI, now presenting today because of ongoing pain. States pain is aggravated by spicy foods, has had similar episode 6 months ago and was diagnosed with cholecystitis, for which she was offered an operation but declined and opted for conservative management with antibiotics because she didn't have someone to take care of her child. Reports having nausea. Denies fevers, chills and changes in bowel habits.      PAST MEDICAL & SURGICAL HISTORY:  No pertinent past medical history  No significant past surgical history      FAMILY HISTORY:      SOCIAL HISTORY:    MEDICATIONS  (STANDING):    MEDICATIONS  (PRN):    Allergies    No Known Allergies    Intolerances        Vital Signs Last 24 Hrs  T(C): 36.7 (2019 22:17), Max: 37.2 (2019 05:02)  T(F): 98 (2019 22:17), Max: 99 (2019 05:02)  HR: 60 (2019 22:17) (54 - 64)  BP: 107/74 (2019 22:17) (99/72 - 108/71)  BP(mean): --  RR: 18 (2019 22:17) (16 - 18)  SpO2: 100% (2019 22:17) (98% - 100%)  Daily Height in cm: 160.02 (2019 20:41)    Daily     General: NAD, well-nourished  HEENT: Atraumatic, EOMI  Resp: Breathing comfortably on RA  CV: Normal sinus rhythm  Abd: soft, ND, tender in RUQ>epiastrium  Ext: ROMIx4, motor strength intact x 4                          12.8   10.5  )-----------( 199      ( 2019 21:54 )             38.0     07-    135  |  98  |  7   ----------------------------<  94  3.7   |  21<L>  |  0.54    Ca    9.2      2019 21:54    TPro  7.2  /  Alb  4.3  /  TBili  0.3  /  DBili  x   /  AST  20  /  ALT  25  /  AlkPhos  78        Urinalysis Basic - ( 2019 22:03 )    Color: Yellow / Appearance: Clear / S.029 / pH: x  Gluc: x / Ketone: Moderate  / Bili: Negative / Urobili: 2 mg/dL   Blood: x / Protein: Trace / Nitrite: Negative   Leuk Esterase: Large / RBC: 7 /hpf / WBC 22 /HPF   Sq Epi: x / Non Sq Epi: 3 /hpf / Bacteria: Negative        Radiographic Findings:       < from: US Abdomen Complete (19 @ 23:08) >  IMPRESSION:     Cholelithiasis, gallbladder wall thickening and positive sonographic   Carranza sign, constellation of findings suggestive of acute cholecystitis.   Recommend clinical correlation.   CBD is mildly dilated measuring up to 0.5 cm, visualized portions do not   demonstrate choledocholithiasis, an MRCP could be performed if clinically   warranted.     < end of copied text >
Low Acute Suicide Risk

## 2023-08-17 NOTE — ED PROVIDER NOTE - CPE EDP RESP NORM
[facility]  [phone]     Call [facility] central scheduling if any testing was order at the time of your visit at [phone] to schedule your test. Tell them your orders are in epic.  If you don't hear from us in 7 days after completing your test call our office [phone].   
- - -

## 2024-01-10 NOTE — ED PROVIDER NOTE - NSTIMEPROVIDERCAREINITIATE_GEN_ER
25-Jul-2019 03:48 Detail Level: Detailed Quality 110: Preventive Care And Screening: Influenza Immunization: Influenza Immunization Administered during Influenza season

## 2024-05-24 NOTE — ED ADULT NURSE NOTE - PRO INTERPRETER NEED 2
No answer, left message to be Notified of Heart Cath procedure arrival time 0600 on Wednesday  [facility]out on [facility]nd [facility]  NPO after midnight  Bring drivers license and insurance information  Wear comfortable clean clothes  Shower morning of and night before with liquid antibacterial soap  Remove jewelry   May have to stay overnight if have PTCA/stent - bring small overnight bag  Bring medications in original bottles - may take am meds with small amount of water.    Do not take any diabetic meds day of procedure.  Made aware of visitors limit to 2 at a time  Follow all instructions given by your physician  Please notify doctor office if you need to cancel or reschedule your procedure   needed at discharge  If you use CPap or BiPap for sleep apnea, please bring machine with you  Leave valuables at home   
English

## 2025-01-30 NOTE — ED ADULT TRIAGE NOTE - AS TEMP SITE
[FreeTextEntry1] : Cardiac murmur, unspecified (785.2) (R01.1) Nonrheumatic mitral (valve) insufficiency (424.0) (I34.0) Essential (primary) hypertension (401.9) (I10) Overweight (278.02) (E66.3)  EKG: Sinus rhythm, IRBBB, nonspecific ST & T wave changes Echo: Normal LV function, mild MR trace AI  Lifestyle changes for control of BP reviewed ie wgt reduction, salt restriction, Etoh avoidance, exercise 30 min aerobic activity per day, avoid NSAID use self monitor BP TIW Lifestyle advice for LDL reduction including reduction of red meat consumption concentrating on a plant based diet. 30 min aerobic exercise per day. Calorie reduction to target BMI<25  oral

## 2025-06-10 NOTE — ED PROCEDURE NOTE - GENERAL PROCEDURE NAME
I had discussed the case with nephrology and Dr. Huizar approved the demadex. Have him take it prn qd until he sees Dr. Huizar until definitive plan for his K can be decided as this can make it drop again. And keep lab appointment as scheduled   [employer] educational